# Patient Record
Sex: FEMALE | Race: WHITE | Employment: OTHER
[De-identification: names, ages, dates, MRNs, and addresses within clinical notes are randomized per-mention and may not be internally consistent; named-entity substitution may affect disease eponyms.]

---

## 2017-01-01 ENCOUNTER — HOME CARE VISIT (OUTPATIENT)
Dept: SCHEDULING | Facility: HOME HEALTH | Age: 72
End: 2017-01-01
Payer: MEDICARE

## 2017-01-01 ENCOUNTER — HOSPICE ADMISSION (OUTPATIENT)
Dept: HOSPICE | Facility: HOSPICE | Age: 72
End: 2017-01-01
Payer: MEDICARE

## 2017-01-01 ENCOUNTER — ANESTHESIA (OUTPATIENT)
Dept: SURGERY | Age: 72
DRG: 570 | End: 2017-01-01
Payer: MEDICARE

## 2017-01-01 ENCOUNTER — HOME CARE VISIT (OUTPATIENT)
Dept: HOSPICE | Facility: HOSPICE | Age: 72
End: 2017-01-01
Payer: MEDICARE

## 2017-01-01 ENCOUNTER — HOSPITAL ENCOUNTER (OUTPATIENT)
Dept: LAB | Age: 72
Discharge: HOME | End: 2017-03-31
Attending: FAMILY MEDICINE
Payer: MEDICARE

## 2017-01-01 ENCOUNTER — HOSPITAL ENCOUNTER (OUTPATIENT)
Dept: LAB | Age: 72
Discharge: HOME | End: 2017-02-14

## 2017-01-01 ENCOUNTER — HOME CARE VISIT (OUTPATIENT)
Dept: HOME HEALTH SERVICES | Facility: HOME HEALTH | Age: 72
End: 2017-01-01
Payer: MEDICARE

## 2017-01-01 ENCOUNTER — HOSPITAL ENCOUNTER (EMERGENCY)
Age: 72
Discharge: NURSING, REHAB OR LONG-TERM CARE | End: 2017-03-17
Attending: EMERGENCY MEDICINE
Payer: MEDICARE

## 2017-01-01 ENCOUNTER — HOME CARE VISIT (OUTPATIENT)
Dept: HOME HEALTH SERVICES | Facility: HOME HEALTH | Age: 72
End: 2017-01-01

## 2017-01-01 ENCOUNTER — APPOINTMENT (OUTPATIENT)
Dept: GENERAL RADIOLOGY | Age: 72
End: 2017-01-01
Attending: EMERGENCY MEDICINE
Payer: MEDICARE

## 2017-01-01 ENCOUNTER — HOME HEALTH ADMISSION (OUTPATIENT)
Dept: HOME HEALTH SERVICES | Facility: HOME HEALTH | Age: 72
End: 2017-01-01
Payer: MEDICARE

## 2017-01-01 ENCOUNTER — HOSPITAL ENCOUNTER (EMERGENCY)
Age: 72
Discharge: HOME | End: 2017-02-03
Attending: EMERGENCY MEDICINE
Payer: MEDICARE

## 2017-01-01 ENCOUNTER — HOSPITAL ENCOUNTER (INPATIENT)
Age: 72
LOS: 2 days | End: 2017-06-08
Attending: INTERNAL MEDICINE | Admitting: INTERNAL MEDICINE
Payer: MEDICARE

## 2017-01-01 ENCOUNTER — ANESTHESIA EVENT (OUTPATIENT)
Dept: SURGERY | Age: 72
DRG: 570 | End: 2017-01-01
Payer: MEDICARE

## 2017-01-01 ENCOUNTER — HOSPITAL ENCOUNTER (OUTPATIENT)
Dept: WOUND CARE | Age: 72
Discharge: HOME | End: 2017-03-30
Attending: SURGERY
Payer: MEDICARE

## 2017-01-01 ENCOUNTER — APPOINTMENT (OUTPATIENT)
Dept: GENERAL RADIOLOGY | Age: 72
DRG: 570 | End: 2017-01-01
Attending: INTERNAL MEDICINE
Payer: MEDICARE

## 2017-01-01 ENCOUNTER — HOSPITAL ENCOUNTER (INPATIENT)
Age: 72
LOS: 6 days | Discharge: NURSING, REHAB OR LONG-TERM CARE | DRG: 570 | End: 2017-02-23
Attending: EMERGENCY MEDICINE | Admitting: INTERNAL MEDICINE
Payer: MEDICARE

## 2017-01-01 ENCOUNTER — HOME HEALTH ADMISSION (OUTPATIENT)
Dept: HOME HEALTH SERVICES | Facility: HOME HEALTH | Age: 72
End: 2017-01-01

## 2017-01-01 ENCOUNTER — HOSPITAL ENCOUNTER (OUTPATIENT)
Dept: WOUND CARE | Age: 72
Discharge: HOME | End: 2017-04-14
Attending: NURSE PRACTITIONER
Payer: MEDICARE

## 2017-01-01 VITALS
TEMPERATURE: 98.3 F | HEART RATE: 62 BPM | BODY MASS INDEX: 39.05 KG/M2 | RESPIRATION RATE: 16 BRPM | OXYGEN SATURATION: 98 % | WEIGHT: 243 LBS | HEIGHT: 66 IN | DIASTOLIC BLOOD PRESSURE: 70 MMHG | SYSTOLIC BLOOD PRESSURE: 144 MMHG

## 2017-01-01 VITALS — SYSTOLIC BLOOD PRESSURE: 120 MMHG | RESPIRATION RATE: 17 BRPM | DIASTOLIC BLOOD PRESSURE: 83 MMHG | HEART RATE: 65 BPM

## 2017-01-01 VITALS
WEIGHT: 270 LBS | SYSTOLIC BLOOD PRESSURE: 170 MMHG | BODY MASS INDEX: 44.98 KG/M2 | HEIGHT: 65 IN | RESPIRATION RATE: 17 BRPM | DIASTOLIC BLOOD PRESSURE: 85 MMHG | OXYGEN SATURATION: 98 % | HEART RATE: 65 BPM | TEMPERATURE: 98.1 F

## 2017-01-01 VITALS
SYSTOLIC BLOOD PRESSURE: 142 MMHG | DIASTOLIC BLOOD PRESSURE: 72 MMHG | TEMPERATURE: 98.3 F | RESPIRATION RATE: 16 BRPM | HEART RATE: 76 BPM

## 2017-01-01 VITALS
SYSTOLIC BLOOD PRESSURE: 126 MMHG | TEMPERATURE: 98 F | RESPIRATION RATE: 18 BRPM | DIASTOLIC BLOOD PRESSURE: 64 MMHG | HEART RATE: 62 BPM

## 2017-01-01 VITALS
RESPIRATION RATE: 20 BRPM | OXYGEN SATURATION: 98 % | SYSTOLIC BLOOD PRESSURE: 132 MMHG | TEMPERATURE: 97.5 F | DIASTOLIC BLOOD PRESSURE: 86 MMHG | HEART RATE: 63 BPM

## 2017-01-01 VITALS
RESPIRATION RATE: 18 BRPM | OXYGEN SATURATION: 97 % | HEART RATE: 63 BPM | DIASTOLIC BLOOD PRESSURE: 68 MMHG | TEMPERATURE: 98.6 F | SYSTOLIC BLOOD PRESSURE: 112 MMHG

## 2017-01-01 VITALS
SYSTOLIC BLOOD PRESSURE: 130 MMHG | RESPIRATION RATE: 18 BRPM | OXYGEN SATURATION: 98 % | DIASTOLIC BLOOD PRESSURE: 82 MMHG | HEART RATE: 61 BPM | TEMPERATURE: 98.6 F

## 2017-01-01 VITALS
RESPIRATION RATE: 18 BRPM | OXYGEN SATURATION: 100 % | HEART RATE: 63 BPM | BODY MASS INDEX: 49.87 KG/M2 | SYSTOLIC BLOOD PRESSURE: 150 MMHG | WEIGHT: 271 LBS | DIASTOLIC BLOOD PRESSURE: 46 MMHG | HEIGHT: 62 IN | TEMPERATURE: 96.7 F

## 2017-01-01 VITALS — HEART RATE: 60 BPM | SYSTOLIC BLOOD PRESSURE: 113 MMHG | DIASTOLIC BLOOD PRESSURE: 47 MMHG | RESPIRATION RATE: 18 BRPM

## 2017-01-01 VITALS
SYSTOLIC BLOOD PRESSURE: 114 MMHG | TEMPERATURE: 97.7 F | DIASTOLIC BLOOD PRESSURE: 68 MMHG | OXYGEN SATURATION: 98 % | HEART RATE: 64 BPM | RESPIRATION RATE: 20 BRPM

## 2017-01-01 VITALS — HEART RATE: 66 BPM | SYSTOLIC BLOOD PRESSURE: 146 MMHG | RESPIRATION RATE: 18 BRPM | DIASTOLIC BLOOD PRESSURE: 62 MMHG

## 2017-01-01 VITALS
RESPIRATION RATE: 18 BRPM | OXYGEN SATURATION: 99 % | DIASTOLIC BLOOD PRESSURE: 60 MMHG | SYSTOLIC BLOOD PRESSURE: 102 MMHG | HEART RATE: 60 BPM

## 2017-01-01 VITALS
RESPIRATION RATE: 18 BRPM | HEART RATE: 82 BPM | DIASTOLIC BLOOD PRESSURE: 60 MMHG | SYSTOLIC BLOOD PRESSURE: 122 MMHG | TEMPERATURE: 97.1 F | OXYGEN SATURATION: 99 %

## 2017-01-01 VITALS
RESPIRATION RATE: 18 BRPM | DIASTOLIC BLOOD PRESSURE: 74 MMHG | HEART RATE: 62 BPM | SYSTOLIC BLOOD PRESSURE: 138 MMHG | TEMPERATURE: 98.2 F

## 2017-01-01 VITALS — RESPIRATION RATE: 20 BRPM | HEART RATE: 96 BPM

## 2017-01-01 VITALS
DIASTOLIC BLOOD PRESSURE: 58 MMHG | TEMPERATURE: 97.6 F | RESPIRATION RATE: 20 BRPM | HEART RATE: 68 BPM | OXYGEN SATURATION: 98 % | SYSTOLIC BLOOD PRESSURE: 158 MMHG

## 2017-01-01 VITALS
HEART RATE: 85 BPM | SYSTOLIC BLOOD PRESSURE: 134 MMHG | RESPIRATION RATE: 16 BRPM | OXYGEN SATURATION: 99 % | TEMPERATURE: 98.3 F | DIASTOLIC BLOOD PRESSURE: 72 MMHG

## 2017-01-01 VITALS
SYSTOLIC BLOOD PRESSURE: 136 MMHG | RESPIRATION RATE: 19 BRPM | TEMPERATURE: 96.6 F | HEART RATE: 78 BPM | DIASTOLIC BLOOD PRESSURE: 80 MMHG

## 2017-01-01 VITALS
SYSTOLIC BLOOD PRESSURE: 122 MMHG | HEART RATE: 60 BPM | OXYGEN SATURATION: 99 % | RESPIRATION RATE: 18 BRPM | DIASTOLIC BLOOD PRESSURE: 60 MMHG | TEMPERATURE: 97.1 F

## 2017-01-01 VITALS
TEMPERATURE: 97.2 F | DIASTOLIC BLOOD PRESSURE: 68 MMHG | HEART RATE: 78 BPM | SYSTOLIC BLOOD PRESSURE: 122 MMHG | RESPIRATION RATE: 20 BRPM

## 2017-01-01 VITALS — DIASTOLIC BLOOD PRESSURE: 72 MMHG | HEART RATE: 72 BPM | RESPIRATION RATE: 20 BRPM | SYSTOLIC BLOOD PRESSURE: 147 MMHG

## 2017-01-01 VITALS
RESPIRATION RATE: 18 BRPM | DIASTOLIC BLOOD PRESSURE: 78 MMHG | HEART RATE: 64 BPM | TEMPERATURE: 97.4 F | SYSTOLIC BLOOD PRESSURE: 118 MMHG

## 2017-01-01 VITALS
SYSTOLIC BLOOD PRESSURE: 130 MMHG | DIASTOLIC BLOOD PRESSURE: 60 MMHG | HEART RATE: 70 BPM | OXYGEN SATURATION: 98 % | TEMPERATURE: 97.5 F

## 2017-01-01 VITALS
SYSTOLIC BLOOD PRESSURE: 90 MMHG | RESPIRATION RATE: 22 BRPM | HEART RATE: 61 BPM | TEMPERATURE: 97.7 F | DIASTOLIC BLOOD PRESSURE: 54 MMHG | OXYGEN SATURATION: 99 %

## 2017-01-01 VITALS — TEMPERATURE: 97 F | SYSTOLIC BLOOD PRESSURE: 97 MMHG | DIASTOLIC BLOOD PRESSURE: 52 MMHG

## 2017-01-01 VITALS — SYSTOLIC BLOOD PRESSURE: 112 MMHG | DIASTOLIC BLOOD PRESSURE: 42 MMHG | RESPIRATION RATE: 18 BRPM | HEART RATE: 78 BPM

## 2017-01-01 VITALS
HEART RATE: 64 BPM | DIASTOLIC BLOOD PRESSURE: 46 MMHG | TEMPERATURE: 98.9 F | SYSTOLIC BLOOD PRESSURE: 118 MMHG | RESPIRATION RATE: 18 BRPM

## 2017-01-01 VITALS
RESPIRATION RATE: 18 BRPM | SYSTOLIC BLOOD PRESSURE: 132 MMHG | OXYGEN SATURATION: 98 % | TEMPERATURE: 97.5 F | HEART RATE: 63 BPM | DIASTOLIC BLOOD PRESSURE: 86 MMHG

## 2017-01-01 VITALS
HEART RATE: 63 BPM | DIASTOLIC BLOOD PRESSURE: 68 MMHG | OXYGEN SATURATION: 97 % | TEMPERATURE: 98.6 F | RESPIRATION RATE: 18 BRPM | SYSTOLIC BLOOD PRESSURE: 112 MMHG

## 2017-01-01 VITALS
SYSTOLIC BLOOD PRESSURE: 168 MMHG | RESPIRATION RATE: 20 BRPM | HEART RATE: 78 BPM | DIASTOLIC BLOOD PRESSURE: 88 MMHG | TEMPERATURE: 98.1 F

## 2017-01-01 VITALS
HEART RATE: 80 BPM | DIASTOLIC BLOOD PRESSURE: 80 MMHG | SYSTOLIC BLOOD PRESSURE: 136 MMHG | TEMPERATURE: 98.3 F | RESPIRATION RATE: 20 BRPM

## 2017-01-01 VITALS — SYSTOLIC BLOOD PRESSURE: 128 MMHG | HEART RATE: 72 BPM | RESPIRATION RATE: 20 BRPM | DIASTOLIC BLOOD PRESSURE: 70 MMHG

## 2017-01-01 VITALS
HEART RATE: 62 BPM | OXYGEN SATURATION: 97 % | DIASTOLIC BLOOD PRESSURE: 60 MMHG | TEMPERATURE: 97.4 F | RESPIRATION RATE: 18 BRPM | SYSTOLIC BLOOD PRESSURE: 132 MMHG

## 2017-01-01 VITALS — HEART RATE: 60 BPM | SYSTOLIC BLOOD PRESSURE: 110 MMHG | RESPIRATION RATE: 20 BRPM | DIASTOLIC BLOOD PRESSURE: 60 MMHG

## 2017-01-01 VITALS
HEART RATE: 61 BPM | DIASTOLIC BLOOD PRESSURE: 60 MMHG | OXYGEN SATURATION: 100 % | TEMPERATURE: 97.6 F | RESPIRATION RATE: 16 BRPM | SYSTOLIC BLOOD PRESSURE: 138 MMHG

## 2017-01-01 VITALS — HEART RATE: 60 BPM | RESPIRATION RATE: 12 BRPM

## 2017-01-01 VITALS
HEART RATE: 61 BPM | TEMPERATURE: 96.8 F | OXYGEN SATURATION: 98 % | RESPIRATION RATE: 18 BRPM | SYSTOLIC BLOOD PRESSURE: 130 MMHG | DIASTOLIC BLOOD PRESSURE: 82 MMHG

## 2017-01-01 VITALS — SYSTOLIC BLOOD PRESSURE: 142 MMHG | HEART RATE: 61 BPM | DIASTOLIC BLOOD PRESSURE: 67 MMHG | RESPIRATION RATE: 18 BRPM

## 2017-01-01 VITALS — RESPIRATION RATE: 17 BRPM | DIASTOLIC BLOOD PRESSURE: 68 MMHG | HEART RATE: 78 BPM | SYSTOLIC BLOOD PRESSURE: 122 MMHG

## 2017-01-01 VITALS
HEART RATE: 59 BPM | RESPIRATION RATE: 18 BRPM | DIASTOLIC BLOOD PRESSURE: 64 MMHG | OXYGEN SATURATION: 97 % | SYSTOLIC BLOOD PRESSURE: 138 MMHG | TEMPERATURE: 97.2 F

## 2017-01-01 VITALS
HEART RATE: 70 BPM | DIASTOLIC BLOOD PRESSURE: 80 MMHG | SYSTOLIC BLOOD PRESSURE: 134 MMHG | TEMPERATURE: 46.4 F | RESPIRATION RATE: 18 BRPM

## 2017-01-01 VITALS
TEMPERATURE: 97.2 F | OXYGEN SATURATION: 99 % | HEART RATE: 60 BPM | SYSTOLIC BLOOD PRESSURE: 145 MMHG | DIASTOLIC BLOOD PRESSURE: 60 MMHG

## 2017-01-01 VITALS
RESPIRATION RATE: 19 BRPM | TEMPERATURE: 97.5 F | DIASTOLIC BLOOD PRESSURE: 80 MMHG | SYSTOLIC BLOOD PRESSURE: 140 MMHG | HEART RATE: 67 BPM

## 2017-01-01 VITALS — SYSTOLIC BLOOD PRESSURE: 157 MMHG | HEART RATE: 69 BPM | RESPIRATION RATE: 18 BRPM | DIASTOLIC BLOOD PRESSURE: 73 MMHG

## 2017-01-01 DIAGNOSIS — R73.9 HYPERGLYCEMIA: ICD-10-CM

## 2017-01-01 DIAGNOSIS — L89.154: ICD-10-CM

## 2017-01-01 DIAGNOSIS — L08.9 INFECTED DECUBITUS ULCER, STAGE IV (HCC): Primary | ICD-10-CM

## 2017-01-01 DIAGNOSIS — L89.94 INFECTED DECUBITUS ULCER, STAGE IV (HCC): Primary | ICD-10-CM

## 2017-01-01 DIAGNOSIS — R07.89 MUSCULOSKELETAL CHEST PAIN: Primary | ICD-10-CM

## 2017-01-01 DIAGNOSIS — R07.81 PLEURITIC CHEST PAIN: ICD-10-CM

## 2017-01-01 DIAGNOSIS — L89.154 SACRAL DECUBITUS ULCER, STAGE IV (HCC): ICD-10-CM

## 2017-01-01 DIAGNOSIS — N30.00 ACUTE CYSTITIS WITHOUT HEMATURIA: Primary | ICD-10-CM

## 2017-01-01 LAB
ALBUMIN SERPL BCP-MCNC: 2.5 G/DL (ref 3.2–4.6)
ALBUMIN SERPL BCP-MCNC: 2.5 G/DL (ref 3.2–4.6)
ALBUMIN SERPL BCP-MCNC: 2.6 G/DL (ref 3.2–4.6)
ALBUMIN/GLOB SERPL: 0.5 {RATIO} (ref 1.2–3.5)
ALBUMIN/GLOB SERPL: 0.6 {RATIO} (ref 1.2–3.5)
ALBUMIN/GLOB SERPL: 0.6 {RATIO} (ref 1.2–3.5)
ALP SERPL-CCNC: 126 U/L (ref 50–136)
ALP SERPL-CCNC: 133 U/L (ref 50–136)
ALP SERPL-CCNC: 141 U/L (ref 50–136)
ALT SERPL-CCNC: 37 U/L (ref 12–65)
ALT SERPL-CCNC: 45 U/L (ref 12–65)
ALT SERPL-CCNC: 64 U/L (ref 12–65)
AMORPH CRY URNS QL MICRO: ABNORMAL
AMORPH CRY URNS QL MICRO: ABNORMAL
ANAEROBE ID RESULT 1, RAND1T: ABNORMAL
ANAEROBE ID W/SUSCEPT., ANIDLT: NORMAL
ANION GAP BLD CALC-SCNC: 4 MMOL/L (ref 7–16)
ANION GAP BLD CALC-SCNC: 6 MMOL/L (ref 7–16)
ANION GAP BLD CALC-SCNC: 6 MMOL/L (ref 7–16)
ANION GAP BLD CALC-SCNC: 7 MMOL/L (ref 7–16)
ANION GAP BLD CALC-SCNC: 8 MMOL/L (ref 7–16)
ANTIMICROBIAL SUSCEPT., RAND5T: ABNORMAL
APPEARANCE UR: ABNORMAL
AST SERPL W P-5'-P-CCNC: 37 U/L (ref 15–37)
AST SERPL W P-5'-P-CCNC: 39 U/L (ref 15–37)
AST SERPL W P-5'-P-CCNC: 50 U/L (ref 15–37)
ATRIAL RATE: 59 BPM
BACTERIA SPEC CULT: ABNORMAL
BACTERIA SPEC CULT: NORMAL
BACTERIA URNS QL MICRO: ABNORMAL /HPF
BASOPHILS # BLD AUTO: 0 K/UL (ref 0–0.2)
BASOPHILS # BLD: 0 % (ref 0–2)
BILIRUB SERPL-MCNC: 0.3 MG/DL (ref 0.2–1.1)
BILIRUB SERPL-MCNC: 0.4 MG/DL (ref 0.2–1.1)
BILIRUB SERPL-MCNC: 0.4 MG/DL (ref 0.2–1.1)
BILIRUB UR QL: NEGATIVE
BNP SERPL-MCNC: <2 PG/ML
BUN SERPL-MCNC: 19 MG/DL (ref 8–23)
BUN SERPL-MCNC: 20 MG/DL (ref 8–23)
BUN SERPL-MCNC: 21 MG/DL (ref 8–23)
BUN SERPL-MCNC: 27 MG/DL (ref 8–23)
BUN SERPL-MCNC: 38 MG/DL (ref 8–23)
BUN SERPL-MCNC: 52 MG/DL (ref 8–23)
BUN SERPL-MCNC: 52 MG/DL (ref 8–23)
CALCIUM SERPL-MCNC: 8.5 MG/DL (ref 8.3–10.4)
CALCIUM SERPL-MCNC: 8.6 MG/DL (ref 8.3–10.4)
CALCIUM SERPL-MCNC: 8.8 MG/DL (ref 8.3–10.4)
CALCULATED P AXIS, ECG09: NORMAL DEGREES
CALCULATED R AXIS, ECG10: 55 DEGREES
CALCULATED T AXIS, ECG11: 58 DEGREES
CASTS URNS QL MICRO: 0 /LPF
CASTS URNS QL MICRO: ABNORMAL /LPF
CHLORIDE SERPL-SCNC: 104 MMOL/L (ref 98–107)
CHLORIDE SERPL-SCNC: 106 MMOL/L (ref 98–107)
CHLORIDE SERPL-SCNC: 106 MMOL/L (ref 98–107)
CHLORIDE SERPL-SCNC: 109 MMOL/L (ref 98–107)
CHLORIDE SERPL-SCNC: 111 MMOL/L (ref 98–107)
CHLORIDE SERPL-SCNC: 98 MMOL/L (ref 98–107)
CHLORIDE SERPL-SCNC: 99 MMOL/L (ref 98–107)
CO2 SERPL-SCNC: 27 MMOL/L (ref 21–32)
CO2 SERPL-SCNC: 27 MMOL/L (ref 21–32)
CO2 SERPL-SCNC: 28 MMOL/L (ref 21–32)
CO2 SERPL-SCNC: 29 MMOL/L (ref 21–32)
CO2 SERPL-SCNC: 30 MMOL/L (ref 21–32)
CO2 SERPL-SCNC: 31 MMOL/L (ref 21–32)
CO2 SERPL-SCNC: 32 MMOL/L (ref 21–32)
COLOR UR: ABNORMAL
COLOR UR: ABNORMAL
COLOR UR: YELLOW
CREAT SERPL-MCNC: 0.83 MG/DL (ref 0.6–1)
CREAT SERPL-MCNC: 0.91 MG/DL (ref 0.6–1)
CREAT SERPL-MCNC: 1.04 MG/DL (ref 0.6–1)
CREAT SERPL-MCNC: 1.09 MG/DL (ref 0.6–1)
CREAT SERPL-MCNC: 1.37 MG/DL (ref 0.6–1)
CREAT SERPL-MCNC: 1.38 MG/DL (ref 0.6–1)
CREAT SERPL-MCNC: 1.46 MG/DL (ref 0.6–1)
CRP SERPL-MCNC: 4.7 MG/DL (ref 0–0.9)
CRYSTALS URNS QL MICRO: ABNORMAL /LPF
DIAGNOSIS, 93000: NORMAL
DIASTOLIC BP, ECG02: NORMAL MMHG
DIFFERENTIAL METHOD BLD: ABNORMAL
EOSINOPHIL # BLD: 0 K/UL (ref 0–0.8)
EOSINOPHIL # BLD: 0.1 K/UL (ref 0–0.8)
EOSINOPHIL NFR BLD: 0 % (ref 0.5–7.8)
EOSINOPHIL NFR BLD: 1 % (ref 0.5–7.8)
EPI CELLS #/AREA URNS HPF: 0 /HPF
EPI CELLS #/AREA URNS HPF: 0 /HPF
EPI CELLS #/AREA URNS HPF: ABNORMAL /HPF
ERYTHROCYTE [DISTWIDTH] IN BLOOD BY AUTOMATED COUNT: 13.9 % (ref 11.9–14.6)
ERYTHROCYTE [DISTWIDTH] IN BLOOD BY AUTOMATED COUNT: 14.9 % (ref 11.9–14.6)
ERYTHROCYTE [DISTWIDTH] IN BLOOD BY AUTOMATED COUNT: 16.7 % (ref 11.9–14.6)
ERYTHROCYTE [DISTWIDTH] IN BLOOD BY AUTOMATED COUNT: 16.8 % (ref 11.9–14.6)
ERYTHROCYTE [DISTWIDTH] IN BLOOD BY AUTOMATED COUNT: 16.8 % (ref 11.9–14.6)
ERYTHROCYTE [DISTWIDTH] IN BLOOD BY AUTOMATED COUNT: 16.9 % (ref 11.9–14.6)
ERYTHROCYTE [DISTWIDTH] IN BLOOD BY AUTOMATED COUNT: 17.1 % (ref 11.9–14.6)
ERYTHROCYTE [DISTWIDTH] IN BLOOD BY AUTOMATED COUNT: 17.5 % (ref 11.9–14.6)
EST. AVERAGE GLUCOSE BLD GHB EST-MCNC: 180 MG/DL
GLOBULIN SER CALC-MCNC: 4.3 G/DL (ref 2.3–3.5)
GLOBULIN SER CALC-MCNC: 4.5 G/DL (ref 2.3–3.5)
GLOBULIN SER CALC-MCNC: 4.7 G/DL (ref 2.3–3.5)
GLUCOSE BLD STRIP.AUTO-MCNC: 136 MG/DL (ref 65–100)
GLUCOSE BLD STRIP.AUTO-MCNC: 143 MG/DL (ref 65–100)
GLUCOSE BLD STRIP.AUTO-MCNC: 146 MG/DL (ref 65–100)
GLUCOSE BLD STRIP.AUTO-MCNC: 147 MG/DL (ref 65–100)
GLUCOSE BLD STRIP.AUTO-MCNC: 151 MG/DL (ref 65–100)
GLUCOSE BLD STRIP.AUTO-MCNC: 151 MG/DL (ref 65–100)
GLUCOSE BLD STRIP.AUTO-MCNC: 155 MG/DL (ref 65–100)
GLUCOSE BLD STRIP.AUTO-MCNC: 158 MG/DL (ref 65–100)
GLUCOSE BLD STRIP.AUTO-MCNC: 159 MG/DL (ref 65–100)
GLUCOSE BLD STRIP.AUTO-MCNC: 163 MG/DL (ref 65–100)
GLUCOSE BLD STRIP.AUTO-MCNC: 173 MG/DL (ref 65–100)
GLUCOSE BLD STRIP.AUTO-MCNC: 176 MG/DL (ref 65–100)
GLUCOSE BLD STRIP.AUTO-MCNC: 187 MG/DL (ref 65–100)
GLUCOSE BLD STRIP.AUTO-MCNC: 189 MG/DL (ref 65–100)
GLUCOSE BLD STRIP.AUTO-MCNC: 191 MG/DL (ref 65–100)
GLUCOSE BLD STRIP.AUTO-MCNC: 192 MG/DL (ref 65–100)
GLUCOSE BLD STRIP.AUTO-MCNC: 200 MG/DL (ref 65–100)
GLUCOSE BLD STRIP.AUTO-MCNC: 205 MG/DL (ref 65–100)
GLUCOSE BLD STRIP.AUTO-MCNC: 215 MG/DL (ref 65–100)
GLUCOSE BLD STRIP.AUTO-MCNC: 222 MG/DL (ref 65–100)
GLUCOSE BLD STRIP.AUTO-MCNC: 237 MG/DL (ref 65–100)
GLUCOSE BLD STRIP.AUTO-MCNC: 242 MG/DL (ref 65–100)
GLUCOSE BLD STRIP.AUTO-MCNC: 256 MG/DL (ref 65–100)
GLUCOSE BLD STRIP.AUTO-MCNC: 270 MG/DL (ref 65–100)
GLUCOSE SERPL-MCNC: 129 MG/DL (ref 65–100)
GLUCOSE SERPL-MCNC: 161 MG/DL (ref 65–100)
GLUCOSE SERPL-MCNC: 176 MG/DL (ref 65–100)
GLUCOSE SERPL-MCNC: 185 MG/DL (ref 65–100)
GLUCOSE SERPL-MCNC: 185 MG/DL (ref 65–100)
GLUCOSE SERPL-MCNC: 217 MG/DL (ref 65–100)
GLUCOSE SERPL-MCNC: 327 MG/DL (ref 65–100)
GLUCOSE UR STRIP.AUTO-MCNC: 100 MG/DL
GLUCOSE UR STRIP.AUTO-MCNC: 1000 MG/DL
GLUCOSE UR STRIP.AUTO-MCNC: NEGATIVE MG/DL
GRAM STN SPEC: ABNORMAL
HBA1C MFR BLD: 7.9 % (ref 4.8–6)
HCT VFR BLD AUTO: 26.1 % (ref 35.8–46.3)
HCT VFR BLD AUTO: 26.3 % (ref 35.8–46.3)
HCT VFR BLD AUTO: 27.1 % (ref 35.8–46.3)
HCT VFR BLD AUTO: 28.1 % (ref 35.8–46.3)
HCT VFR BLD AUTO: 29.5 % (ref 35.8–46.3)
HCT VFR BLD AUTO: 31.7 % (ref 35.8–46.3)
HCT VFR BLD AUTO: 34 % (ref 35.8–46.3)
HCT VFR BLD AUTO: 34.1 % (ref 35.8–46.3)
HGB BLD-MCNC: 10.1 G/DL (ref 11.7–15.4)
HGB BLD-MCNC: 10.3 G/DL (ref 11.7–15.4)
HGB BLD-MCNC: 10.6 G/DL (ref 11.7–15.4)
HGB BLD-MCNC: 7.8 G/DL (ref 11.7–15.4)
HGB BLD-MCNC: 8 G/DL (ref 11.7–15.4)
HGB BLD-MCNC: 8.2 G/DL (ref 11.7–15.4)
HGB BLD-MCNC: 8.7 G/DL (ref 11.7–15.4)
HGB BLD-MCNC: 9.2 G/DL (ref 11.7–15.4)
HGB UR QL STRIP: ABNORMAL
HGB UR QL STRIP: NEGATIVE
HGB UR QL STRIP: NEGATIVE
IMM GRANULOCYTES # BLD: 0 K/UL (ref 0–0.5)
IMM GRANULOCYTES # BLD: 0.1 K/UL (ref 0–0.5)
IMM GRANULOCYTES NFR BLD AUTO: 0.3 % (ref 0–5)
IMM GRANULOCYTES NFR BLD AUTO: 0.3 % (ref 0–5)
IMM GRANULOCYTES NFR BLD AUTO: 0.6 % (ref 0–5)
IMM GRANULOCYTES NFR BLD AUTO: 0.7 % (ref 0–5)
IMM GRANULOCYTES NFR BLD AUTO: 1.2 % (ref 0–5)
KETONES UR QL STRIP.AUTO: NEGATIVE MG/DL
LACTATE BLD-SCNC: 1.6 MMOL/L (ref 0.5–1.9)
LEUKOCYTE ESTERASE UR QL STRIP.AUTO: ABNORMAL
LYMPHOCYTES # BLD AUTO: 10 % (ref 13–44)
LYMPHOCYTES # BLD AUTO: 11 % (ref 13–44)
LYMPHOCYTES # BLD AUTO: 13 % (ref 13–44)
LYMPHOCYTES # BLD AUTO: 13 % (ref 13–44)
LYMPHOCYTES # BLD AUTO: 14 % (ref 13–44)
LYMPHOCYTES # BLD AUTO: 15 % (ref 13–44)
LYMPHOCYTES # BLD AUTO: 16 % (ref 13–44)
LYMPHOCYTES # BLD: 0.9 K/UL (ref 0.5–4.6)
LYMPHOCYTES # BLD: 1 K/UL (ref 0.5–4.6)
LYMPHOCYTES # BLD: 1.1 K/UL (ref 0.5–4.6)
LYMPHOCYTES # BLD: 1.2 K/UL (ref 0.5–4.6)
LYMPHOCYTES # BLD: 1.3 K/UL (ref 0.5–4.6)
LYMPHOCYTES # BLD: 1.4 K/UL (ref 0.5–4.6)
LYMPHOCYTES # BLD: 1.5 K/UL (ref 0.5–4.6)
MCH RBC QN AUTO: 29.7 PG (ref 26.1–32.9)
MCH RBC QN AUTO: 29.8 PG (ref 26.1–32.9)
MCH RBC QN AUTO: 29.9 PG (ref 26.1–32.9)
MCH RBC QN AUTO: 29.9 PG (ref 26.1–32.9)
MCH RBC QN AUTO: 30 PG (ref 26.1–32.9)
MCH RBC QN AUTO: 30 PG (ref 26.1–32.9)
MCH RBC QN AUTO: 30.4 PG (ref 26.1–32.9)
MCH RBC QN AUTO: 30.8 PG (ref 26.1–32.9)
MCHC RBC AUTO-ENTMCNC: 29.9 G/DL (ref 31.4–35)
MCHC RBC AUTO-ENTMCNC: 30.2 G/DL (ref 31.4–35)
MCHC RBC AUTO-ENTMCNC: 30.3 G/DL (ref 31.4–35)
MCHC RBC AUTO-ENTMCNC: 30.4 G/DL (ref 31.4–35)
MCHC RBC AUTO-ENTMCNC: 31 G/DL (ref 31.4–35)
MCHC RBC AUTO-ENTMCNC: 31.2 G/DL (ref 31.4–35)
MCHC RBC AUTO-ENTMCNC: 31.2 G/DL (ref 31.4–35)
MCHC RBC AUTO-ENTMCNC: 31.9 G/DL (ref 31.4–35)
MCV RBC AUTO: 100.6 FL (ref 79.6–97.8)
MCV RBC AUTO: 95.8 FL (ref 79.6–97.8)
MCV RBC AUTO: 96.1 FL (ref 79.6–97.8)
MCV RBC AUTO: 96.6 FL (ref 79.6–97.8)
MCV RBC AUTO: 96.9 FL (ref 79.6–97.8)
MCV RBC AUTO: 98.1 FL (ref 79.6–97.8)
MCV RBC AUTO: 98.2 FL (ref 79.6–97.8)
MCV RBC AUTO: 99.6 FL (ref 79.6–97.8)
MM INDURATION POC: NORMAL MM (ref 0–5)
MM INDURATION POC: NORMAL MM (ref 0–5)
MONOCYTES # BLD: 0.5 K/UL (ref 0.1–1.3)
MONOCYTES # BLD: 0.5 K/UL (ref 0.1–1.3)
MONOCYTES # BLD: 0.6 K/UL (ref 0.1–1.3)
MONOCYTES # BLD: 0.7 K/UL (ref 0.1–1.3)
MONOCYTES # BLD: 0.7 K/UL (ref 0.1–1.3)
MONOCYTES # BLD: 0.8 K/UL (ref 0.1–1.3)
MONOCYTES # BLD: 1 K/UL (ref 0.1–1.3)
MONOCYTES NFR BLD AUTO: 6 % (ref 4–12)
MONOCYTES NFR BLD AUTO: 6 % (ref 4–12)
MONOCYTES NFR BLD AUTO: 7 % (ref 4–12)
MONOCYTES NFR BLD AUTO: 7 % (ref 4–12)
MONOCYTES NFR BLD AUTO: 9 % (ref 4–12)
MUCOUS THREADS URNS QL MICRO: 0 /LPF
NEUTS SEG # BLD: 5.2 K/UL (ref 1.7–8.2)
NEUTS SEG # BLD: 6.1 K/UL (ref 1.7–8.2)
NEUTS SEG # BLD: 6.8 K/UL (ref 1.7–8.2)
NEUTS SEG # BLD: 7.2 K/UL (ref 1.7–8.2)
NEUTS SEG # BLD: 7.4 K/UL (ref 1.7–8.2)
NEUTS SEG # BLD: 8.2 K/UL (ref 1.7–8.2)
NEUTS SEG # BLD: 9.2 K/UL (ref 1.7–8.2)
NEUTS SEG NFR BLD AUTO: 73 % (ref 43–78)
NEUTS SEG NFR BLD AUTO: 75 % (ref 43–78)
NEUTS SEG NFR BLD AUTO: 76 % (ref 43–78)
NEUTS SEG NFR BLD AUTO: 77 % (ref 43–78)
NEUTS SEG NFR BLD AUTO: 79 % (ref 43–78)
NEUTS SEG NFR BLD AUTO: 81 % (ref 43–78)
NEUTS SEG NFR BLD AUTO: 83 % (ref 43–78)
NITRITE UR QL STRIP.AUTO: NEGATIVE
P-R INTERVAL, ECG05: NORMAL MS
PH UR STRIP: 6.5 [PH] (ref 5–9)
PH UR STRIP: 7 [PH] (ref 5–9)
PH UR STRIP: 8.5 [PH] (ref 5–9)
PLATELET # BLD AUTO: 250 K/UL (ref 150–450)
PLATELET # BLD AUTO: 276 K/UL (ref 150–450)
PLATELET # BLD AUTO: 287 K/UL (ref 150–450)
PLATELET # BLD AUTO: 292 K/UL (ref 150–450)
PLATELET # BLD AUTO: 313 K/UL (ref 150–450)
PLATELET # BLD AUTO: 314 K/UL (ref 150–450)
PLATELET # BLD AUTO: 329 K/UL (ref 150–450)
PLATELET # BLD AUTO: 350 K/UL (ref 150–450)
PMV BLD AUTO: 10.2 FL (ref 10.8–14.1)
PMV BLD AUTO: 10.3 FL (ref 10.8–14.1)
PMV BLD AUTO: 10.4 FL (ref 10.8–14.1)
PMV BLD AUTO: 10.4 FL (ref 10.8–14.1)
PMV BLD AUTO: 10.5 FL (ref 10.8–14.1)
PMV BLD AUTO: 10.7 FL (ref 10.8–14.1)
PMV BLD AUTO: 10.7 FL (ref 10.8–14.1)
PMV BLD AUTO: 9.8 FL (ref 10.8–14.1)
POTASSIUM SERPL-SCNC: 4 MMOL/L (ref 3.5–5.1)
POTASSIUM SERPL-SCNC: 4.4 MMOL/L (ref 3.5–5.1)
POTASSIUM SERPL-SCNC: 4.5 MMOL/L (ref 3.5–5.1)
POTASSIUM SERPL-SCNC: 4.7 MMOL/L (ref 3.5–5.1)
PPD POC: NEGATIVE NEGATIVE
PPD POC: NORMAL NEGATIVE
PROT SERPL-MCNC: 6.9 G/DL (ref 6.3–8.2)
PROT SERPL-MCNC: 7 G/DL (ref 6.3–8.2)
PROT SERPL-MCNC: 7.2 G/DL (ref 6.3–8.2)
PROT UR STRIP-MCNC: 30 MG/DL
PROT UR STRIP-MCNC: NEGATIVE MG/DL
PROT UR STRIP-MCNC: NEGATIVE MG/DL
Q-T INTERVAL, ECG07: 490 MS
QRS DURATION, ECG06: 92 MS
QTC CALCULATION (BEZET), ECG08: 490 MS
RBC # BLD AUTO: 2.62 M/UL (ref 4.05–5.25)
RBC # BLD AUTO: 2.68 M/UL (ref 4.05–5.25)
RBC # BLD AUTO: 2.76 M/UL (ref 4.05–5.25)
RBC # BLD AUTO: 2.9 M/UL (ref 4.05–5.25)
RBC # BLD AUTO: 3.07 M/UL (ref 4.05–5.25)
RBC # BLD AUTO: 3.28 M/UL (ref 4.05–5.25)
RBC # BLD AUTO: 3.39 M/UL (ref 4.05–5.25)
RBC # BLD AUTO: 3.55 M/UL (ref 4.05–5.25)
RBC #/AREA URNS HPF: ABNORMAL /HPF
SERVICE CMNT-IMP: ABNORMAL
SERVICE CMNT-IMP: NORMAL
SODIUM SERPL-SCNC: 137 MMOL/L (ref 136–145)
SODIUM SERPL-SCNC: 137 MMOL/L (ref 136–145)
SODIUM SERPL-SCNC: 139 MMOL/L (ref 136–145)
SODIUM SERPL-SCNC: 139 MMOL/L (ref 136–145)
SODIUM SERPL-SCNC: 143 MMOL/L (ref 136–145)
SODIUM SERPL-SCNC: 144 MMOL/L (ref 136–145)
SODIUM SERPL-SCNC: 145 MMOL/L (ref 136–145)
SP GR UR REFRACTOMETRY: 1.01 (ref 1–1.02)
SP GR UR REFRACTOMETRY: 1.01 (ref 1–1.02)
SP GR UR REFRACTOMETRY: 1.02 (ref 1–1.02)
SPECIMEN SOURCE: NORMAL
SYSTOLIC BP, ECG01: NORMAL MMHG
TROPONIN I BLD-MCNC: 0 NG/ML (ref 0–0.08)
UROBILINOGEN UR QL STRIP.AUTO: 0.2 EU/DL (ref 0.2–1)
VANCOMYCIN TROUGH SERPL-MCNC: 27.3 UG/ML (ref 5–20)
VENTRICULAR RATE, ECG03: 60 BPM
WBC # BLD AUTO: 10 K/UL (ref 4.3–11.1)
WBC # BLD AUTO: 11.9 K/UL (ref 4.3–11.1)
WBC # BLD AUTO: 17.9 K/UL (ref 4.3–11.1)
WBC # BLD AUTO: 7.2 K/UL (ref 4.3–11.1)
WBC # BLD AUTO: 7.8 K/UL (ref 4.3–11.1)
WBC # BLD AUTO: 8.9 K/UL (ref 4.3–11.1)
WBC # BLD AUTO: 9 K/UL (ref 4.3–11.1)
WBC # BLD AUTO: 9.4 K/UL (ref 4.3–11.1)
WBC URNS QL MICRO: ABNORMAL /HPF

## 2017-01-01 PROCEDURE — 85025 COMPLETE CBC W/AUTO DIFF WBC: CPT | Performed by: INTERNAL MEDICINE

## 2017-01-01 PROCEDURE — 74011250637 HC RX REV CODE- 250/637: Performed by: INTERNAL MEDICINE

## 2017-01-01 PROCEDURE — A4213 20+ CC SYRINGE ONLY: HCPCS

## 2017-01-01 PROCEDURE — 74011000258 HC RX REV CODE- 258: Performed by: INTERNAL MEDICINE

## 2017-01-01 PROCEDURE — 0651 HSPC ROUTINE HOME CARE

## 2017-01-01 PROCEDURE — 74011636637 HC RX REV CODE- 636/637: Performed by: INTERNAL MEDICINE

## 2017-01-01 PROCEDURE — G0156 HHCP-SVS OF AIDE,EA 15 MIN: HCPCS

## 2017-01-01 PROCEDURE — A4333 URINARY CATH ANCHOR DEVICE: HCPCS

## 2017-01-01 PROCEDURE — 65270000029 HC RM PRIVATE

## 2017-01-01 PROCEDURE — G0158 HHC OT ASSISTANT EA 15: HCPCS

## 2017-01-01 PROCEDURE — A6212 FOAM DRG <=16 SQ IN W/BORDER: HCPCS

## 2017-01-01 PROCEDURE — 94640 AIRWAY INHALATION TREATMENT: CPT

## 2017-01-01 PROCEDURE — 74011250636 HC RX REV CODE- 250/636: Performed by: INTERNAL MEDICINE

## 2017-01-01 PROCEDURE — G0299 HHS/HOSPICE OF RN EA 15 MIN: HCPCS

## 2017-01-01 PROCEDURE — 77030032490 HC SLV COMPR SCD KNE COVD -B: Performed by: COLON & RECTAL SURGERY

## 2017-01-01 PROCEDURE — 74011000302 HC RX REV CODE- 302: Performed by: INTERNAL MEDICINE

## 2017-01-01 PROCEDURE — 36415 COLL VENOUS BLD VENIPUNCTURE: CPT | Performed by: INTERNAL MEDICINE

## 2017-01-01 PROCEDURE — 87176 TISSUE HOMOGENIZATION CULTR: CPT | Performed by: INTERNAL MEDICINE

## 2017-01-01 PROCEDURE — 94760 N-INVAS EAR/PLS OXIMETRY 1: CPT

## 2017-01-01 PROCEDURE — 85025 COMPLETE CBC W/AUTO DIFF WBC: CPT | Performed by: EMERGENCY MEDICINE

## 2017-01-01 PROCEDURE — 80048 BASIC METABOLIC PNL TOTAL CA: CPT | Performed by: INTERNAL MEDICINE

## 2017-01-01 PROCEDURE — 74011000258 HC RX REV CODE- 258: Performed by: EMERGENCY MEDICINE

## 2017-01-01 PROCEDURE — 82962 GLUCOSE BLOOD TEST: CPT

## 2017-01-01 PROCEDURE — 74011250636 HC RX REV CODE- 250/636: Performed by: EMERGENCY MEDICINE

## 2017-01-01 PROCEDURE — A6234 HYDROCOLLD DRG <=16 W/O BDR: HCPCS

## 2017-01-01 PROCEDURE — 74011250636 HC RX REV CODE- 250/636: Performed by: NURSE PRACTITIONER

## 2017-01-01 PROCEDURE — 0656 HSPC GENERAL INPATIENT

## 2017-01-01 PROCEDURE — 87186 SC STD MICRODIL/AGAR DIL: CPT | Performed by: EMERGENCY MEDICINE

## 2017-01-01 PROCEDURE — 77010033678 HC OXYGEN DAILY

## 2017-01-01 PROCEDURE — 71010 XR CHEST PORT: CPT

## 2017-01-01 PROCEDURE — 87075 CULTR BACTERIA EXCEPT BLOOD: CPT | Performed by: COLON & RECTAL SURGERY

## 2017-01-01 PROCEDURE — 77030011256 HC DRSG MEPILEX <16IN NO BORD MOLN -A

## 2017-01-01 PROCEDURE — HOSPICE MEDICATION HC HH HOSPICE MEDICATION

## 2017-01-01 PROCEDURE — G0157 HHC PT ASSISTANT EA 15: HCPCS

## 2017-01-01 PROCEDURE — A5120 SKIN BARRIER, WIPE OR SWAB: HCPCS

## 2017-01-01 PROCEDURE — 77030018836 HC SOL IRR NACL ICUM -A

## 2017-01-01 PROCEDURE — A6222 GAUZE <=16 IN NO W/SAL W/O B: HCPCS

## 2017-01-01 PROCEDURE — 74011000250 HC RX REV CODE- 250: Performed by: INTERNAL MEDICINE

## 2017-01-01 PROCEDURE — 74011636637 HC RX REV CODE- 636/637: Performed by: NURSE PRACTITIONER

## 2017-01-01 PROCEDURE — A6252 ABSORPT DRG >16 <=48 W/O BDR: HCPCS

## 2017-01-01 PROCEDURE — 96374 THER/PROPH/DIAG INJ IV PUSH: CPT | Performed by: EMERGENCY MEDICINE

## 2017-01-01 PROCEDURE — 99213 OFFICE O/P EST LOW 20 MIN: CPT

## 2017-01-01 PROCEDURE — G0155 HHCP-SVS OF CSW,EA 15 MIN: HCPCS

## 2017-01-01 PROCEDURE — 77030019605

## 2017-01-01 PROCEDURE — 400013 HH SOC

## 2017-01-01 PROCEDURE — 74011250636 HC RX REV CODE- 250/636

## 2017-01-01 PROCEDURE — 77030020399: Performed by: COLON & RECTAL SURGERY

## 2017-01-01 PROCEDURE — 80053 COMPREHEN METABOLIC PANEL: CPT | Performed by: EMERGENCY MEDICINE

## 2017-01-01 PROCEDURE — 74011250637 HC RX REV CODE- 250/637: Performed by: NURSE PRACTITIONER

## 2017-01-01 PROCEDURE — A6250 SKIN SEAL PROTECT MOISTURIZR: HCPCS

## 2017-01-01 PROCEDURE — 83605 ASSAY OF LACTIC ACID: CPT

## 2017-01-01 PROCEDURE — 36415 COLL VENOUS BLD VENIPUNCTURE: CPT | Performed by: NURSE PRACTITIONER

## 2017-01-01 PROCEDURE — 85027 COMPLETE CBC AUTOMATED: CPT | Performed by: FAMILY MEDICINE

## 2017-01-01 PROCEDURE — 76210000016 HC OR PH I REC 1 TO 1.5 HR: Performed by: COLON & RECTAL SURGERY

## 2017-01-01 PROCEDURE — 87205 SMEAR GRAM STAIN: CPT | Performed by: EMERGENCY MEDICINE

## 2017-01-01 PROCEDURE — A4310 INSERT TRAY W/O BAG/CATH: HCPCS

## 2017-01-01 PROCEDURE — A4357 BEDSIDE DRAINAGE BAG: HCPCS

## 2017-01-01 PROCEDURE — T4541 LARGE DISPOSABLE UNDERPAD: HCPCS

## 2017-01-01 PROCEDURE — 96375 TX/PRO/DX INJ NEW DRUG ADDON: CPT | Performed by: EMERGENCY MEDICINE

## 2017-01-01 PROCEDURE — 0JB70ZZ EXCISION OF BACK SUBCUTANEOUS TISSUE AND FASCIA, OPEN APPROACH: ICD-10-PCS | Performed by: COLON & RECTAL SURGERY

## 2017-01-01 PROCEDURE — 87205 SMEAR GRAM STAIN: CPT | Performed by: COLON & RECTAL SURGERY

## 2017-01-01 PROCEDURE — 81001 URINALYSIS AUTO W/SCOPE: CPT | Performed by: INTERNAL MEDICINE

## 2017-01-01 PROCEDURE — 86580 TB INTRADERMAL TEST: CPT | Performed by: INTERNAL MEDICINE

## 2017-01-01 PROCEDURE — 99284 EMERGENCY DEPT VISIT MOD MDM: CPT | Performed by: EMERGENCY MEDICINE

## 2017-01-01 PROCEDURE — A6199 ALGINATE DRSG WOUND FILLER: HCPCS

## 2017-01-01 PROCEDURE — A4247 BETADINE/IODINE SWABS/WIPES: HCPCS

## 2017-01-01 PROCEDURE — A4927 NON-STERILE GLOVES: HCPCS

## 2017-01-01 PROCEDURE — 77030005515 HC CATH URETH FOL14 BARD -B

## 2017-01-01 PROCEDURE — A4649 SURGICAL SUPPLIES: HCPCS

## 2017-01-01 PROCEDURE — G0152 HHCP-SERV OF OT,EA 15 MIN: HCPCS

## 2017-01-01 PROCEDURE — 77030033269 HC SLV COMPR SCD KNE2 CARD -B

## 2017-01-01 PROCEDURE — 3336500001 HSPC ELECTION

## 2017-01-01 PROCEDURE — 84484 ASSAY OF TROPONIN QUANT: CPT

## 2017-01-01 PROCEDURE — A6196 ALGINATE DRESSING <=16 SQ IN: HCPCS

## 2017-01-01 PROCEDURE — A6223 GAUZE >16<=48 NO W/SAL W/O B: HCPCS

## 2017-01-01 PROCEDURE — 80202 ASSAY OF VANCOMYCIN: CPT | Performed by: INTERNAL MEDICINE

## 2017-01-01 PROCEDURE — G0151 HHCP-SERV OF PT,EA 15 MIN: HCPCS

## 2017-01-01 PROCEDURE — A6266 IMPREG GAUZE NO H20/SAL/YARD: HCPCS

## 2017-01-01 PROCEDURE — A4452 WATERPROOF TAPE: HCPCS

## 2017-01-01 PROCEDURE — A4450 NON-WATERPROOF TAPE: HCPCS

## 2017-01-01 PROCEDURE — 87186 SC STD MICRODIL/AGAR DIL: CPT | Performed by: COLON & RECTAL SURGERY

## 2017-01-01 PROCEDURE — 74011000250 HC RX REV CODE- 250: Performed by: EMERGENCY MEDICINE

## 2017-01-01 PROCEDURE — 86140 C-REACTIVE PROTEIN: CPT | Performed by: EMERGENCY MEDICINE

## 2017-01-01 PROCEDURE — 99343 PR HOME VST NEW PATIENT MOD-HI SEVERITY 45 MINUTES: CPT | Performed by: INTERNAL MEDICINE

## 2017-01-01 PROCEDURE — 87040 BLOOD CULTURE FOR BACTERIA: CPT | Performed by: EMERGENCY MEDICINE

## 2017-01-01 PROCEDURE — 87077 CULTURE AEROBIC IDENTIFY: CPT | Performed by: EMERGENCY MEDICINE

## 2017-01-01 PROCEDURE — 85025 COMPLETE CBC W/AUTO DIFF WBC: CPT | Performed by: NURSE PRACTITIONER

## 2017-01-01 PROCEDURE — 96365 THER/PROPH/DIAG IV INF INIT: CPT | Performed by: EMERGENCY MEDICINE

## 2017-01-01 PROCEDURE — 83880 ASSAY OF NATRIURETIC PEPTIDE: CPT | Performed by: EMERGENCY MEDICINE

## 2017-01-01 PROCEDURE — 74011250636 HC RX REV CODE- 250/636: Performed by: ANESTHESIOLOGY

## 2017-01-01 PROCEDURE — 36415 COLL VENOUS BLD VENIPUNCTURE: CPT | Performed by: FAMILY MEDICINE

## 2017-01-01 PROCEDURE — A4338 INDWELLING CATHETER LATEX: HCPCS

## 2017-01-01 PROCEDURE — 94664 DEMO&/EVAL PT USE INHALER: CPT

## 2017-01-01 PROCEDURE — 93005 ELECTROCARDIOGRAM TRACING: CPT | Performed by: EMERGENCY MEDICINE

## 2017-01-01 PROCEDURE — A6260 WOUND CLEANSER ANY TYPE/SIZE: HCPCS

## 2017-01-01 PROCEDURE — 81003 URINALYSIS AUTO W/O SCOPE: CPT | Performed by: EMERGENCY MEDICINE

## 2017-01-01 PROCEDURE — 81003 URINALYSIS AUTO W/O SCOPE: CPT | Performed by: INTERNAL MEDICINE

## 2017-01-01 PROCEDURE — 74011000250 HC RX REV CODE- 250

## 2017-01-01 PROCEDURE — 74011000258 HC RX REV CODE- 258

## 2017-01-01 PROCEDURE — 76060000033 HC ANESTHESIA 1 TO 1.5 HR: Performed by: COLON & RECTAL SURGERY

## 2017-01-01 PROCEDURE — 83036 HEMOGLOBIN GLYCOSYLATED A1C: CPT | Performed by: INTERNAL MEDICINE

## 2017-01-01 PROCEDURE — 99285 EMERGENCY DEPT VISIT HI MDM: CPT | Performed by: EMERGENCY MEDICINE

## 2017-01-01 PROCEDURE — 76010000149 HC OR TIME 1 TO 1.5 HR: Performed by: COLON & RECTAL SURGERY

## 2017-01-01 PROCEDURE — 80048 BASIC METABOLIC PNL TOTAL CA: CPT | Performed by: NURSE PRACTITIONER

## 2017-01-01 PROCEDURE — 87641 MR-STAPH DNA AMP PROBE: CPT | Performed by: INTERNAL MEDICINE

## 2017-01-01 PROCEDURE — 87076 CULTURE ANAEROBE IDENT EACH: CPT | Performed by: INTERNAL MEDICINE

## 2017-01-01 PROCEDURE — 51702 INSERT TEMP BLADDER CATH: CPT | Performed by: EMERGENCY MEDICINE

## 2017-01-01 PROCEDURE — 74011000250 HC RX REV CODE- 250: Performed by: COLON & RECTAL SURGERY

## 2017-01-01 PROCEDURE — 87077 CULTURE AEROBIC IDENTIFY: CPT | Performed by: COLON & RECTAL SURGERY

## 2017-01-01 PROCEDURE — 51798 US URINE CAPACITY MEASURE: CPT

## 2017-01-01 PROCEDURE — 99214 OFFICE O/P EST MOD 30 MIN: CPT

## 2017-01-01 RX ORDER — ROCURONIUM BROMIDE 10 MG/ML
INJECTION, SOLUTION INTRAVENOUS AS NEEDED
Status: DISCONTINUED | OUTPATIENT
Start: 2017-01-01 | End: 2017-01-01 | Stop reason: HOSPADM

## 2017-01-01 RX ORDER — AMIODARONE HYDROCHLORIDE 200 MG/1
200 TABLET ORAL 2 TIMES DAILY
Status: DISCONTINUED | OUTPATIENT
Start: 2017-01-01 | End: 2017-01-01 | Stop reason: HOSPADM

## 2017-01-01 RX ORDER — HYDROCODONE BITARTRATE AND ACETAMINOPHEN 5; 325 MG/1; MG/1
1 TABLET ORAL
Qty: 12 TAB | Refills: 0 | Status: SHIPPED | OUTPATIENT
Start: 2017-01-01 | End: 2017-01-01

## 2017-01-01 RX ORDER — FENTANYL CITRATE 50 UG/ML
INJECTION, SOLUTION INTRAMUSCULAR; INTRAVENOUS AS NEEDED
Status: DISCONTINUED | OUTPATIENT
Start: 2017-01-01 | End: 2017-01-01 | Stop reason: HOSPADM

## 2017-01-01 RX ORDER — SAME BUTANEDISULFONATE/BETAINE 400-600 MG
500 POWDER IN PACKET (EA) ORAL 2 TIMES DAILY
Status: DISCONTINUED | OUTPATIENT
Start: 2017-01-01 | End: 2017-01-01 | Stop reason: HOSPADM

## 2017-01-01 RX ORDER — LOSARTAN POTASSIUM 25 MG/1
25 TABLET ORAL DAILY
Status: DISCONTINUED | OUTPATIENT
Start: 2017-01-01 | End: 2017-01-01 | Stop reason: HOSPADM

## 2017-01-01 RX ORDER — HYDROCODONE BITARTRATE AND ACETAMINOPHEN 5; 325 MG/1; MG/1
1 TABLET ORAL AS NEEDED
Status: DISCONTINUED | OUTPATIENT
Start: 2017-01-01 | End: 2017-01-01 | Stop reason: HOSPADM

## 2017-01-01 RX ORDER — DOCUSATE SODIUM 100 MG/1
100 CAPSULE, LIQUID FILLED ORAL 2 TIMES DAILY
Status: DISCONTINUED | OUTPATIENT
Start: 2017-01-01 | End: 2017-01-01 | Stop reason: HOSPADM

## 2017-01-01 RX ORDER — FACIAL-BODY WIPES
10 EACH TOPICAL AS NEEDED
Status: DISCONTINUED | OUTPATIENT
Start: 2017-01-01 | End: 2017-01-01 | Stop reason: HOSPADM

## 2017-01-01 RX ORDER — SODIUM CHLORIDE, SODIUM LACTATE, POTASSIUM CHLORIDE, CALCIUM CHLORIDE 600; 310; 30; 20 MG/100ML; MG/100ML; MG/100ML; MG/100ML
INJECTION, SOLUTION INTRAVENOUS
Status: DISCONTINUED | OUTPATIENT
Start: 2017-01-01 | End: 2017-01-01 | Stop reason: HOSPADM

## 2017-01-01 RX ORDER — ASPIRIN 81 MG/1
81 TABLET ORAL DAILY
Status: DISCONTINUED | OUTPATIENT
Start: 2017-01-01 | End: 2017-01-01 | Stop reason: HOSPADM

## 2017-01-01 RX ORDER — LIDOCAINE HYDROCHLORIDE 20 MG/ML
INJECTION, SOLUTION EPIDURAL; INFILTRATION; INTRACAUDAL; PERINEURAL AS NEEDED
Status: DISCONTINUED | OUTPATIENT
Start: 2017-01-01 | End: 2017-01-01 | Stop reason: HOSPADM

## 2017-01-01 RX ORDER — HYDROCODONE BITARTRATE AND ACETAMINOPHEN 10; 325 MG/1; MG/1
1 TABLET ORAL
Qty: 15 TAB | Refills: 0 | Status: SHIPPED | OUTPATIENT
Start: 2017-01-01 | End: 2017-01-01

## 2017-01-01 RX ORDER — INSULIN GLARGINE 100 [IU]/ML
12 INJECTION, SOLUTION SUBCUTANEOUS
Status: DISCONTINUED | OUTPATIENT
Start: 2017-01-01 | End: 2017-01-01

## 2017-01-01 RX ORDER — HYDROMORPHONE HYDROCHLORIDE 1 MG/ML
1 INJECTION, SOLUTION INTRAMUSCULAR; INTRAVENOUS; SUBCUTANEOUS
Status: DISCONTINUED | OUTPATIENT
Start: 2017-01-01 | End: 2017-01-01 | Stop reason: HOSPADM

## 2017-01-01 RX ORDER — VANCOMYCIN 1.75 GRAM/500 ML IN 0.9 % SODIUM CHLORIDE INTRAVENOUS
1750 EVERY 12 HOURS
Status: DISCONTINUED | OUTPATIENT
Start: 2017-01-01 | End: 2017-01-01

## 2017-01-01 RX ORDER — SODIUM CHLORIDE 0.9 % (FLUSH) 0.9 %
5-10 SYRINGE (ML) INJECTION EVERY 8 HOURS
Status: DISCONTINUED | OUTPATIENT
Start: 2017-01-01 | End: 2017-01-01 | Stop reason: SDUPTHER

## 2017-01-01 RX ORDER — CEFPODOXIME PROXETIL 200 MG/1
200 TABLET, FILM COATED ORAL EVERY 12 HOURS
Qty: 14 TAB | Refills: 0 | Status: SHIPPED | OUTPATIENT
Start: 2017-01-01 | End: 2017-01-01

## 2017-01-01 RX ORDER — ALBUTEROL SULFATE 2.5 MG/.5ML
2.5 SOLUTION RESPIRATORY (INHALATION)
Status: DISCONTINUED | OUTPATIENT
Start: 2017-01-01 | End: 2017-01-01 | Stop reason: HOSPADM

## 2017-01-01 RX ORDER — CEPHALEXIN 500 MG/1
500 CAPSULE ORAL 4 TIMES DAILY
Qty: 28 CAP | Refills: 0 | Status: SHIPPED | OUTPATIENT
Start: 2017-01-01 | End: 2017-01-01

## 2017-01-01 RX ORDER — INSULIN LISPRO 100 [IU]/ML
INJECTION, SOLUTION INTRAVENOUS; SUBCUTANEOUS EVERY 6 HOURS
Status: DISCONTINUED | OUTPATIENT
Start: 2017-01-01 | End: 2017-01-01 | Stop reason: HOSPADM

## 2017-01-01 RX ORDER — MORPHINE SULFATE 2 MG/ML
4 INJECTION, SOLUTION INTRAMUSCULAR; INTRAVENOUS
Status: COMPLETED | OUTPATIENT
Start: 2017-01-01 | End: 2017-01-01

## 2017-01-01 RX ORDER — ACETAMINOPHEN 325 MG/1
650 TABLET ORAL
Status: DISCONTINUED | OUTPATIENT
Start: 2017-01-01 | End: 2017-01-01 | Stop reason: HOSPADM

## 2017-01-01 RX ORDER — HALOPERIDOL 5 MG/ML
2 INJECTION INTRAMUSCULAR
Status: DISCONTINUED | OUTPATIENT
Start: 2017-01-01 | End: 2017-01-01 | Stop reason: HOSPADM

## 2017-01-01 RX ORDER — FENTANYL 25 UG/1
1 PATCH TRANSDERMAL
Status: DISCONTINUED | OUTPATIENT
Start: 2017-01-01 | End: 2017-01-01 | Stop reason: HOSPADM

## 2017-01-01 RX ORDER — ONDANSETRON 2 MG/ML
INJECTION INTRAMUSCULAR; INTRAVENOUS AS NEEDED
Status: DISCONTINUED | OUTPATIENT
Start: 2017-01-01 | End: 2017-01-01 | Stop reason: HOSPADM

## 2017-01-01 RX ORDER — SODIUM CHLORIDE 0.9 % (FLUSH) 0.9 %
5-10 SYRINGE (ML) INJECTION EVERY 8 HOURS
Status: DISCONTINUED | OUTPATIENT
Start: 2017-01-01 | End: 2017-01-01 | Stop reason: HOSPADM

## 2017-01-01 RX ORDER — POLYETHYLENE GLYCOL 3350 17 G/17G
17 POWDER, FOR SOLUTION ORAL 2 TIMES DAILY
Status: DISCONTINUED | OUTPATIENT
Start: 2017-01-01 | End: 2017-01-01 | Stop reason: HOSPADM

## 2017-01-01 RX ORDER — ONDANSETRON 2 MG/ML
4 INJECTION INTRAMUSCULAR; INTRAVENOUS
Status: COMPLETED | OUTPATIENT
Start: 2017-01-01 | End: 2017-01-01

## 2017-01-01 RX ORDER — SODIUM CHLORIDE 0.9 % (FLUSH) 0.9 %
5-10 SYRINGE (ML) INJECTION AS NEEDED
Status: DISCONTINUED | OUTPATIENT
Start: 2017-01-01 | End: 2017-01-01 | Stop reason: HOSPADM

## 2017-01-01 RX ORDER — BUDESONIDE 0.5 MG/2ML
500 INHALANT ORAL
Status: DISCONTINUED | OUTPATIENT
Start: 2017-01-01 | End: 2017-01-01 | Stop reason: ALTCHOICE

## 2017-01-01 RX ORDER — GLYCOPYRROLATE 0.2 MG/ML
0.2 INJECTION INTRAMUSCULAR; INTRAVENOUS
Status: DISCONTINUED | OUTPATIENT
Start: 2017-01-01 | End: 2017-01-01 | Stop reason: HOSPADM

## 2017-01-01 RX ORDER — SUCCINYLCHOLINE CHLORIDE 20 MG/ML
INJECTION INTRAMUSCULAR; INTRAVENOUS AS NEEDED
Status: DISCONTINUED | OUTPATIENT
Start: 2017-01-01 | End: 2017-01-01 | Stop reason: HOSPADM

## 2017-01-01 RX ORDER — VANCOMYCIN 2 GRAM/500 ML IN 0.9 % SODIUM CHLORIDE INTRAVENOUS
2000 ONCE
Status: COMPLETED | OUTPATIENT
Start: 2017-01-01 | End: 2017-01-01

## 2017-01-01 RX ORDER — NAPROXEN SODIUM 550 MG/1
550 TABLET ORAL 2 TIMES DAILY WITH MEALS
Qty: 10 TAB | Refills: 0 | Status: SHIPPED | OUTPATIENT
Start: 2017-01-01 | End: 2017-01-01

## 2017-01-01 RX ORDER — MORPHINE SULFATE 2 MG/ML
2 INJECTION, SOLUTION INTRAMUSCULAR; INTRAVENOUS
Status: DISCONTINUED | OUTPATIENT
Start: 2017-01-01 | End: 2017-01-01 | Stop reason: HOSPADM

## 2017-01-01 RX ORDER — ASCORBIC ACID 500 MG
500 TABLET ORAL 2 TIMES DAILY
Status: DISCONTINUED | OUTPATIENT
Start: 2017-01-01 | End: 2017-01-01 | Stop reason: HOSPADM

## 2017-01-01 RX ORDER — IPRATROPIUM BROMIDE AND ALBUTEROL SULFATE 2.5; .5 MG/3ML; MG/3ML
3 SOLUTION RESPIRATORY (INHALATION)
Status: DISCONTINUED | OUTPATIENT
Start: 2017-01-01 | End: 2017-01-01 | Stop reason: HOSPADM

## 2017-01-01 RX ORDER — BUPIVACAINE HYDROCHLORIDE AND EPINEPHRINE 2.5; 5 MG/ML; UG/ML
INJECTION, SOLUTION EPIDURAL; INFILTRATION; INTRACAUDAL; PERINEURAL AS NEEDED
Status: DISCONTINUED | OUTPATIENT
Start: 2017-01-01 | End: 2017-01-01 | Stop reason: HOSPADM

## 2017-01-01 RX ORDER — DOXYLAMINE SUCCINATE 25 MG
TABLET ORAL DAILY
Status: DISCONTINUED | OUTPATIENT
Start: 2017-01-01 | End: 2017-01-01 | Stop reason: HOSPADM

## 2017-01-01 RX ORDER — KETOROLAC TROMETHAMINE 30 MG/ML
15 INJECTION, SOLUTION INTRAMUSCULAR; INTRAVENOUS
Status: COMPLETED | OUTPATIENT
Start: 2017-01-01 | End: 2017-01-01

## 2017-01-01 RX ORDER — SODIUM CHLORIDE 9 MG/ML
50 INJECTION, SOLUTION INTRAVENOUS CONTINUOUS
Status: DISCONTINUED | OUTPATIENT
Start: 2017-01-01 | End: 2017-01-01 | Stop reason: HOSPADM

## 2017-01-01 RX ORDER — CEFPODOXIME PROXETIL 200 MG/1
200 TABLET, FILM COATED ORAL EVERY 12 HOURS
Status: DISCONTINUED | OUTPATIENT
Start: 2017-01-01 | End: 2017-01-01 | Stop reason: HOSPADM

## 2017-01-01 RX ORDER — SODIUM CHLORIDE, SODIUM LACTATE, POTASSIUM CHLORIDE, CALCIUM CHLORIDE 600; 310; 30; 20 MG/100ML; MG/100ML; MG/100ML; MG/100ML
150 INJECTION, SOLUTION INTRAVENOUS CONTINUOUS
Status: DISCONTINUED | OUTPATIENT
Start: 2017-01-01 | End: 2017-01-01 | Stop reason: HOSPADM

## 2017-01-01 RX ORDER — INSULIN GLARGINE 100 [IU]/ML
22 INJECTION, SOLUTION SUBCUTANEOUS
Status: DISCONTINUED | OUTPATIENT
Start: 2017-01-01 | End: 2017-01-01 | Stop reason: HOSPADM

## 2017-01-01 RX ORDER — HYDROCODONE BITARTRATE AND ACETAMINOPHEN 10; 325 MG/1; MG/1
1 TABLET ORAL
Status: DISCONTINUED | OUTPATIENT
Start: 2017-01-01 | End: 2017-01-01 | Stop reason: HOSPADM

## 2017-01-01 RX ORDER — METRONIDAZOLE 250 MG/1
500 TABLET ORAL DAILY
Status: DISCONTINUED | OUTPATIENT
Start: 2017-01-01 | End: 2017-01-01

## 2017-01-01 RX ORDER — MORPHINE SULFATE 4 MG/ML
4 INJECTION, SOLUTION INTRAMUSCULAR; INTRAVENOUS
Status: COMPLETED | OUTPATIENT
Start: 2017-01-01 | End: 2017-01-01

## 2017-01-01 RX ORDER — PRAVASTATIN SODIUM 20 MG/1
40 TABLET ORAL
Status: DISCONTINUED | OUTPATIENT
Start: 2017-01-01 | End: 2017-01-01 | Stop reason: HOSPADM

## 2017-01-01 RX ORDER — ACETAMINOPHEN 500 MG
1000 TABLET ORAL
Status: DISCONTINUED | OUTPATIENT
Start: 2017-01-01 | End: 2017-01-01 | Stop reason: HOSPADM

## 2017-01-01 RX ORDER — VANCOMYCIN 1.75 GRAM/500 ML IN 0.9 % SODIUM CHLORIDE INTRAVENOUS
1750 EVERY 24 HOURS
Status: DISCONTINUED | OUTPATIENT
Start: 2017-01-01 | End: 2017-01-01

## 2017-01-01 RX ORDER — INSULIN GLARGINE 100 [IU]/ML
5 INJECTION, SOLUTION SUBCUTANEOUS
Status: DISCONTINUED | OUTPATIENT
Start: 2017-01-01 | End: 2017-01-01

## 2017-01-01 RX ORDER — NALOXONE HYDROCHLORIDE 0.4 MG/ML
0.4 INJECTION, SOLUTION INTRAMUSCULAR; INTRAVENOUS; SUBCUTANEOUS AS NEEDED
Status: DISCONTINUED | OUTPATIENT
Start: 2017-01-01 | End: 2017-01-01 | Stop reason: HOSPADM

## 2017-01-01 RX ORDER — ONDANSETRON 2 MG/ML
4 INJECTION INTRAMUSCULAR; INTRAVENOUS
Status: DISCONTINUED | OUTPATIENT
Start: 2017-01-01 | End: 2017-01-01 | Stop reason: HOSPADM

## 2017-01-01 RX ORDER — PROPOFOL 10 MG/ML
INJECTION, EMULSION INTRAVENOUS AS NEEDED
Status: DISCONTINUED | OUTPATIENT
Start: 2017-01-01 | End: 2017-01-01 | Stop reason: HOSPADM

## 2017-01-01 RX ORDER — HYDRALAZINE HYDROCHLORIDE 20 MG/ML
10 INJECTION INTRAMUSCULAR; INTRAVENOUS
Status: DISCONTINUED | OUTPATIENT
Start: 2017-01-01 | End: 2017-01-01 | Stop reason: HOSPADM

## 2017-01-01 RX ORDER — BUDESONIDE 0.5 MG/2ML
500 INHALANT ORAL
Status: DISCONTINUED | OUTPATIENT
Start: 2017-01-01 | End: 2017-01-01 | Stop reason: HOSPADM

## 2017-01-01 RX ORDER — ACETAMINOPHEN 650 MG/1
650 SUPPOSITORY RECTAL
Status: DISCONTINUED | OUTPATIENT
Start: 2017-01-01 | End: 2017-01-01 | Stop reason: HOSPADM

## 2017-01-01 RX ORDER — SODIUM CHLORIDE 9 MG/ML
125 INJECTION, SOLUTION INTRAVENOUS CONTINUOUS
Status: DISCONTINUED | OUTPATIENT
Start: 2017-01-01 | End: 2017-01-01

## 2017-01-01 RX ORDER — BUDESONIDE 0.5 MG/2ML
500 INHALANT ORAL
Status: DISCONTINUED | OUTPATIENT
Start: 2017-01-01 | End: 2017-01-01

## 2017-01-01 RX ORDER — DOXYLAMINE SUCCINATE 25 MG
TABLET ORAL DAILY
Status: DISCONTINUED | OUTPATIENT
Start: 2017-01-01 | End: 2017-01-01

## 2017-01-01 RX ORDER — ALBUTEROL SULFATE 2.5 MG/.5ML
2.5 SOLUTION RESPIRATORY (INHALATION)
Status: DISCONTINUED | OUTPATIENT
Start: 2017-01-01 | End: 2017-01-01

## 2017-01-01 RX ORDER — HYDROMORPHONE HYDROCHLORIDE 2 MG/ML
0.5 INJECTION, SOLUTION INTRAMUSCULAR; INTRAVENOUS; SUBCUTANEOUS
Status: DISCONTINUED | OUTPATIENT
Start: 2017-01-01 | End: 2017-01-01 | Stop reason: HOSPADM

## 2017-01-01 RX ORDER — METRONIDAZOLE 250 MG/1
500 TABLET ORAL DAILY
Status: DISCONTINUED | OUTPATIENT
Start: 2017-01-01 | End: 2017-01-01 | Stop reason: HOSPADM

## 2017-01-01 RX ADMIN — HYDROMORPHONE HYDROCHLORIDE 1 MG: 1 INJECTION, SOLUTION INTRAMUSCULAR; INTRAVENOUS; SUBCUTANEOUS at 11:36

## 2017-01-01 RX ADMIN — Medication 10 ML: at 21:31

## 2017-01-01 RX ADMIN — HYDROMORPHONE HYDROCHLORIDE 1 MG: 1 INJECTION, SOLUTION INTRAMUSCULAR; INTRAVENOUS; SUBCUTANEOUS at 23:49

## 2017-01-01 RX ADMIN — INSULIN LISPRO 2 UNITS: 100 INJECTION, SOLUTION INTRAVENOUS; SUBCUTANEOUS at 23:23

## 2017-01-01 RX ADMIN — Medication 10 ML: at 21:46

## 2017-01-01 RX ADMIN — POLYETHYLENE GLYCOL 3350 17 G: 17 POWDER, FOR SOLUTION ORAL at 22:16

## 2017-01-01 RX ADMIN — ASPIRIN 81 MG: 81 TABLET, COATED ORAL at 08:13

## 2017-01-01 RX ADMIN — ONDANSETRON 4 MG: 2 INJECTION INTRAMUSCULAR; INTRAVENOUS at 12:13

## 2017-01-01 RX ADMIN — CEFPODOXIME PROXETIL 200 MG: 200 TABLET, FILM COATED ORAL at 22:05

## 2017-01-01 RX ADMIN — Medication 10 ML: at 14:15

## 2017-01-01 RX ADMIN — Medication 10 ML: at 22:16

## 2017-01-01 RX ADMIN — Medication 5 ML: at 16:00

## 2017-01-01 RX ADMIN — VANCOMYCIN HYDROCHLORIDE 1000 MG: 1 INJECTION, POWDER, LYOPHILIZED, FOR SOLUTION INTRAVENOUS at 11:35

## 2017-01-01 RX ADMIN — AZTREONAM 1 G: 1 INJECTION, POWDER, LYOPHILIZED, FOR SOLUTION INTRAMUSCULAR; INTRAVENOUS at 01:07

## 2017-01-01 RX ADMIN — SODIUM CHLORIDE, SODIUM LACTATE, POTASSIUM CHLORIDE, CALCIUM CHLORIDE: 600; 310; 30; 20 INJECTION, SOLUTION INTRAVENOUS at 08:23

## 2017-01-01 RX ADMIN — HYDROMORPHONE HYDROCHLORIDE 0.5 MG: 2 INJECTION, SOLUTION INTRAMUSCULAR; INTRAVENOUS; SUBCUTANEOUS at 10:00

## 2017-01-01 RX ADMIN — HYDROMORPHONE HYDROCHLORIDE 1 MG: 1 INJECTION, SOLUTION INTRAMUSCULAR; INTRAVENOUS; SUBCUTANEOUS at 10:00

## 2017-01-01 RX ADMIN — AMIODARONE HYDROCHLORIDE 200 MG: 200 TABLET ORAL at 17:47

## 2017-01-01 RX ADMIN — AZTREONAM 1 G: 1 INJECTION, POWDER, LYOPHILIZED, FOR SOLUTION INTRAMUSCULAR; INTRAVENOUS at 07:46

## 2017-01-01 RX ADMIN — BUDESONIDE 500 MCG: 0.5 INHALANT RESPIRATORY (INHALATION) at 20:28

## 2017-01-01 RX ADMIN — AMIODARONE HYDROCHLORIDE 200 MG: 200 TABLET ORAL at 08:51

## 2017-01-01 RX ADMIN — Medication 500 MG: at 17:47

## 2017-01-01 RX ADMIN — AZTREONAM 1 G: 1 INJECTION, POWDER, LYOPHILIZED, FOR SOLUTION INTRAMUSCULAR; INTRAVENOUS at 01:16

## 2017-01-01 RX ADMIN — Medication 500 MG: at 08:31

## 2017-01-01 RX ADMIN — OXYCODONE HYDROCHLORIDE AND ACETAMINOPHEN 500 MG: 500 TABLET ORAL at 17:51

## 2017-01-01 RX ADMIN — MORPHINE SULFATE 4 MG: 4 INJECTION, SOLUTION INTRAMUSCULAR; INTRAVENOUS at 14:50

## 2017-01-01 RX ADMIN — Medication 500 MG: at 08:13

## 2017-01-01 RX ADMIN — PRAVASTATIN SODIUM 40 MG: 20 TABLET ORAL at 21:30

## 2017-01-01 RX ADMIN — INSULIN LISPRO 4 UNITS: 100 INJECTION, SOLUTION INTRAVENOUS; SUBCUTANEOUS at 12:24

## 2017-01-01 RX ADMIN — DOCUSATE SODIUM 100 MG: 100 CAPSULE, LIQUID FILLED ORAL at 17:39

## 2017-01-01 RX ADMIN — MORPHINE SULFATE 2 MG: 2 INJECTION, SOLUTION INTRAMUSCULAR; INTRAVENOUS at 23:28

## 2017-01-01 RX ADMIN — SODIUM CHLORIDE 75 ML/HR: 900 INJECTION, SOLUTION INTRAVENOUS at 16:56

## 2017-01-01 RX ADMIN — INSULIN GLARGINE 5 UNITS: 100 INJECTION, SOLUTION SUBCUTANEOUS at 21:45

## 2017-01-01 RX ADMIN — ALBUTEROL SULFATE 2.5 MG: 2.5 SOLUTION RESPIRATORY (INHALATION) at 14:04

## 2017-01-01 RX ADMIN — HYDROMORPHONE HYDROCHLORIDE 1 MG: 1 INJECTION, SOLUTION INTRAMUSCULAR; INTRAVENOUS; SUBCUTANEOUS at 23:32

## 2017-01-01 RX ADMIN — OXYCODONE HYDROCHLORIDE AND ACETAMINOPHEN 500 MG: 500 TABLET ORAL at 08:31

## 2017-01-01 RX ADMIN — INSULIN GLARGINE 22 UNITS: 100 INJECTION, SOLUTION SUBCUTANEOUS at 22:05

## 2017-01-01 RX ADMIN — MORPHINE SULFATE 2 MG: 2 INJECTION, SOLUTION INTRAMUSCULAR; INTRAVENOUS at 22:42

## 2017-01-01 RX ADMIN — INSULIN GLARGINE 5 UNITS: 100 INJECTION, SOLUTION SUBCUTANEOUS at 23:59

## 2017-01-01 RX ADMIN — INSULIN GLARGINE 12 UNITS: 100 INJECTION, SOLUTION SUBCUTANEOUS at 22:14

## 2017-01-01 RX ADMIN — INSULIN LISPRO 6 UNITS: 100 INJECTION, SOLUTION INTRAVENOUS; SUBCUTANEOUS at 13:05

## 2017-01-01 RX ADMIN — Medication 4 MG: at 12:13

## 2017-01-01 RX ADMIN — MORPHINE SULFATE 2 MG: 2 INJECTION, SOLUTION INTRAMUSCULAR; INTRAVENOUS at 05:48

## 2017-01-01 RX ADMIN — INSULIN GLARGINE 5 UNITS: 100 INJECTION, SOLUTION SUBCUTANEOUS at 23:22

## 2017-01-01 RX ADMIN — OXYCODONE HYDROCHLORIDE AND ACETAMINOPHEN 500 MG: 500 TABLET ORAL at 08:03

## 2017-01-01 RX ADMIN — ALBUTEROL SULFATE 2.5 MG: 2.5 SOLUTION RESPIRATORY (INHALATION) at 20:28

## 2017-01-01 RX ADMIN — DOCUSATE SODIUM 100 MG: 100 CAPSULE, LIQUID FILLED ORAL at 17:47

## 2017-01-01 RX ADMIN — HYDROMORPHONE HYDROCHLORIDE 1 MG: 1 INJECTION, SOLUTION INTRAMUSCULAR; INTRAVENOUS; SUBCUTANEOUS at 12:31

## 2017-01-01 RX ADMIN — ALBUTEROL SULFATE 2.5 MG: 2.5 SOLUTION RESPIRATORY (INHALATION) at 08:00

## 2017-01-01 RX ADMIN — ALBUTEROL SULFATE 2.5 MG: 2.5 SOLUTION RESPIRATORY (INHALATION) at 14:08

## 2017-01-01 RX ADMIN — POLYETHYLENE GLYCOL 3350 17 G: 17 POWDER, FOR SOLUTION ORAL at 08:03

## 2017-01-01 RX ADMIN — OXYCODONE HYDROCHLORIDE AND ACETAMINOPHEN 500 MG: 500 TABLET ORAL at 17:47

## 2017-01-01 RX ADMIN — OXYCODONE HYDROCHLORIDE AND ACETAMINOPHEN 500 MG: 500 TABLET ORAL at 08:56

## 2017-01-01 RX ADMIN — FENTANYL 1 PATCH: 25 PATCH TRANSDERMAL at 13:50

## 2017-01-01 RX ADMIN — Medication 10 ML: at 05:23

## 2017-01-01 RX ADMIN — HYDROCODONE BITARTRATE AND ACETAMINOPHEN 1 TABLET: 10; 325 TABLET ORAL at 09:07

## 2017-01-01 RX ADMIN — BUDESONIDE 500 MCG: 0.5 INHALANT RESPIRATORY (INHALATION) at 08:20

## 2017-01-01 RX ADMIN — BUDESONIDE 500 MCG: 0.5 INHALANT RESPIRATORY (INHALATION) at 08:42

## 2017-01-01 RX ADMIN — VANCOMYCIN HYDROCHLORIDE 1750 MG: 10 INJECTION, POWDER, LYOPHILIZED, FOR SOLUTION INTRAVENOUS at 11:01

## 2017-01-01 RX ADMIN — HALOPERIDOL LACTATE 2 MG: 5 INJECTION, SOLUTION INTRAMUSCULAR at 05:49

## 2017-01-01 RX ADMIN — AMIODARONE HYDROCHLORIDE 200 MG: 200 TABLET ORAL at 17:00

## 2017-01-01 RX ADMIN — HALOPERIDOL LACTATE 2 MG: 5 INJECTION, SOLUTION INTRAMUSCULAR at 03:38

## 2017-01-01 RX ADMIN — ONDANSETRON HYDROCHLORIDE 4 MG: 2 SOLUTION INTRAMUSCULAR; INTRAVENOUS at 17:28

## 2017-01-01 RX ADMIN — ONDANSETRON HYDROCHLORIDE 4 MG: 2 SOLUTION INTRAMUSCULAR; INTRAVENOUS at 19:29

## 2017-01-01 RX ADMIN — ALBUTEROL SULFATE 2.5 MG: 2.5 SOLUTION RESPIRATORY (INHALATION) at 19:59

## 2017-01-01 RX ADMIN — ALBUTEROL SULFATE 2.5 MG: 2.5 SOLUTION RESPIRATORY (INHALATION) at 20:50

## 2017-01-01 RX ADMIN — INSULIN LISPRO 2 UNITS: 100 INJECTION, SOLUTION INTRAVENOUS; SUBCUTANEOUS at 17:52

## 2017-01-01 RX ADMIN — ALBUTEROL SULFATE 2.5 MG: 2.5 SOLUTION RESPIRATORY (INHALATION) at 14:15

## 2017-01-01 RX ADMIN — BUDESONIDE 500 MCG: 0.5 INHALANT RESPIRATORY (INHALATION) at 08:00

## 2017-01-01 RX ADMIN — MORPHINE SULFATE 2 MG: 2 INJECTION, SOLUTION INTRAMUSCULAR; INTRAVENOUS at 03:37

## 2017-01-01 RX ADMIN — MORPHINE SULFATE 2 MG: 2 INJECTION, SOLUTION INTRAMUSCULAR; INTRAVENOUS at 02:49

## 2017-01-01 RX ADMIN — ALBUTEROL SULFATE 2.5 MG: 2.5 SOLUTION RESPIRATORY (INHALATION) at 13:10

## 2017-01-01 RX ADMIN — CLINDAMYCIN PHOSPHATE 600 MG: 150 INJECTION, SOLUTION, CONCENTRATE INTRAVENOUS at 14:13

## 2017-01-01 RX ADMIN — INSULIN LISPRO 4 UNITS: 100 INJECTION, SOLUTION INTRAVENOUS; SUBCUTANEOUS at 12:27

## 2017-01-01 RX ADMIN — POLYETHYLENE GLYCOL 3350 17 G: 17 POWDER, FOR SOLUTION ORAL at 08:15

## 2017-01-01 RX ADMIN — DOCUSATE SODIUM 100 MG: 100 CAPSULE, LIQUID FILLED ORAL at 08:13

## 2017-01-01 RX ADMIN — CEFTRIAXONE 1 G: 1 INJECTION, POWDER, FOR SOLUTION INTRAMUSCULAR; INTRAVENOUS at 13:27

## 2017-01-01 RX ADMIN — DOCUSATE SODIUM 100 MG: 100 CAPSULE, LIQUID FILLED ORAL at 08:03

## 2017-01-01 RX ADMIN — BUDESONIDE 500 MCG: 0.5 INHALANT RESPIRATORY (INHALATION) at 20:57

## 2017-01-01 RX ADMIN — ROCURONIUM BROMIDE 5 MG: 10 INJECTION, SOLUTION INTRAVENOUS at 08:31

## 2017-01-01 RX ADMIN — HYDROMORPHONE HYDROCHLORIDE 0.5 MG: 2 INJECTION, SOLUTION INTRAMUSCULAR; INTRAVENOUS; SUBCUTANEOUS at 09:55

## 2017-01-01 RX ADMIN — ALBUTEROL SULFATE 2.5 MG: 2.5 SOLUTION RESPIRATORY (INHALATION) at 08:42

## 2017-01-01 RX ADMIN — HALOPERIDOL LACTATE 2 MG: 5 INJECTION, SOLUTION INTRAMUSCULAR at 22:42

## 2017-01-01 RX ADMIN — OXYCODONE HYDROCHLORIDE AND ACETAMINOPHEN 500 MG: 500 TABLET ORAL at 17:39

## 2017-01-01 RX ADMIN — OXYCODONE HYDROCHLORIDE AND ACETAMINOPHEN 500 MG: 500 TABLET ORAL at 08:15

## 2017-01-01 RX ADMIN — POLYETHYLENE GLYCOL 3350 17 G: 17 POWDER, FOR SOLUTION ORAL at 22:05

## 2017-01-01 RX ADMIN — AZTREONAM 1 G: 1 INJECTION, POWDER, LYOPHILIZED, FOR SOLUTION INTRAMUSCULAR; INTRAVENOUS at 08:35

## 2017-01-01 RX ADMIN — VANCOMYCIN HYDROCHLORIDE 1000 MG: 1 INJECTION, POWDER, LYOPHILIZED, FOR SOLUTION INTRAVENOUS at 10:34

## 2017-01-01 RX ADMIN — PRAVASTATIN SODIUM 40 MG: 20 TABLET ORAL at 22:16

## 2017-01-01 RX ADMIN — SODIUM CHLORIDE 125 ML/HR: 900 INJECTION, SOLUTION INTRAVENOUS at 11:07

## 2017-01-01 RX ADMIN — INSULIN LISPRO 4 UNITS: 100 INJECTION, SOLUTION INTRAVENOUS; SUBCUTANEOUS at 06:00

## 2017-01-01 RX ADMIN — AZTREONAM 1 G: 1 INJECTION, POWDER, LYOPHILIZED, FOR SOLUTION INTRAMUSCULAR; INTRAVENOUS at 23:40

## 2017-01-01 RX ADMIN — Medication 10 ML: at 06:03

## 2017-01-01 RX ADMIN — INSULIN LISPRO 2 UNITS: 100 INJECTION, SOLUTION INTRAVENOUS; SUBCUTANEOUS at 13:37

## 2017-01-01 RX ADMIN — ASPIRIN 81 MG: 81 TABLET, COATED ORAL at 08:56

## 2017-01-01 RX ADMIN — PROPOFOL 110 MG: 10 INJECTION, EMULSION INTRAVENOUS at 08:31

## 2017-01-01 RX ADMIN — POLYETHYLENE GLYCOL 3350 17 G: 17 POWDER, FOR SOLUTION ORAL at 08:31

## 2017-01-01 RX ADMIN — ALBUTEROL SULFATE 2.5 MG: 2.5 SOLUTION RESPIRATORY (INHALATION) at 02:21

## 2017-01-01 RX ADMIN — Medication 10 ML: at 05:49

## 2017-01-01 RX ADMIN — DOCUSATE SODIUM 100 MG: 100 CAPSULE, LIQUID FILLED ORAL at 08:56

## 2017-01-01 RX ADMIN — INSULIN LISPRO 6 UNITS: 100 INJECTION, SOLUTION INTRAVENOUS; SUBCUTANEOUS at 17:50

## 2017-01-01 RX ADMIN — HYDROMORPHONE HYDROCHLORIDE 0.5 MG: 2 INJECTION, SOLUTION INTRAMUSCULAR; INTRAVENOUS; SUBCUTANEOUS at 09:50

## 2017-01-01 RX ADMIN — AZTREONAM 1 G: 1 INJECTION, POWDER, LYOPHILIZED, FOR SOLUTION INTRAMUSCULAR; INTRAVENOUS at 07:51

## 2017-01-01 RX ADMIN — AMIODARONE HYDROCHLORIDE 200 MG: 200 TABLET ORAL at 17:39

## 2017-01-01 RX ADMIN — Medication 10 ML: at 22:04

## 2017-01-01 RX ADMIN — HALOPERIDOL LACTATE 2 MG: 5 INJECTION, SOLUTION INTRAMUSCULAR at 20:27

## 2017-01-01 RX ADMIN — AMIODARONE HYDROCHLORIDE 200 MG: 200 TABLET ORAL at 08:15

## 2017-01-01 RX ADMIN — INSULIN LISPRO 2 UNITS: 100 INJECTION, SOLUTION INTRAVENOUS; SUBCUTANEOUS at 06:36

## 2017-01-01 RX ADMIN — INSULIN LISPRO 2 UNITS: 100 INJECTION, SOLUTION INTRAVENOUS; SUBCUTANEOUS at 05:58

## 2017-01-01 RX ADMIN — TUBERCULIN PURIFIED PROTEIN DERIVATIVE 5 UNITS: 5 INJECTION, SOLUTION INTRADERMAL at 18:00

## 2017-01-01 RX ADMIN — Medication 10 ML: at 21:55

## 2017-01-01 RX ADMIN — AZTREONAM 1 G: 1 INJECTION, POWDER, LYOPHILIZED, FOR SOLUTION INTRAMUSCULAR; INTRAVENOUS at 15:58

## 2017-01-01 RX ADMIN — KETOROLAC TROMETHAMINE 15 MG: 30 INJECTION, SOLUTION INTRAMUSCULAR at 12:13

## 2017-01-01 RX ADMIN — LOSARTAN POTASSIUM 25 MG: 25 TABLET ORAL at 08:15

## 2017-01-01 RX ADMIN — ONDANSETRON HYDROCHLORIDE 4 MG: 2 SOLUTION INTRAMUSCULAR; INTRAVENOUS at 07:47

## 2017-01-01 RX ADMIN — LOSARTAN POTASSIUM 25 MG: 25 TABLET ORAL at 08:31

## 2017-01-01 RX ADMIN — PRAVASTATIN SODIUM 40 MG: 20 TABLET ORAL at 21:45

## 2017-01-01 RX ADMIN — VANCOMYCIN HYDROCHLORIDE 1000 MG: 1 INJECTION, POWDER, LYOPHILIZED, FOR SOLUTION INTRAVENOUS at 22:16

## 2017-01-01 RX ADMIN — HALOPERIDOL LACTATE 2 MG: 5 INJECTION, SOLUTION INTRAMUSCULAR at 23:28

## 2017-01-01 RX ADMIN — ONDANSETRON HYDROCHLORIDE 4 MG: 2 SOLUTION INTRAMUSCULAR; INTRAVENOUS at 01:46

## 2017-01-01 RX ADMIN — AMIODARONE HYDROCHLORIDE 200 MG: 200 TABLET ORAL at 08:03

## 2017-01-01 RX ADMIN — AZTREONAM 1 G: 1 INJECTION, POWDER, LYOPHILIZED, FOR SOLUTION INTRAMUSCULAR; INTRAVENOUS at 15:56

## 2017-01-01 RX ADMIN — BUDESONIDE 500 MCG: 0.5 INHALANT RESPIRATORY (INHALATION) at 19:50

## 2017-01-01 RX ADMIN — INSULIN LISPRO 2 UNITS: 100 INJECTION, SOLUTION INTRAVENOUS; SUBCUTANEOUS at 17:51

## 2017-01-01 RX ADMIN — HYDROMORPHONE HYDROCHLORIDE 1 MG: 1 INJECTION, SOLUTION INTRAMUSCULAR; INTRAVENOUS; SUBCUTANEOUS at 16:52

## 2017-01-01 RX ADMIN — SODIUM CHLORIDE 1000 ML: 900 INJECTION, SOLUTION INTRAVENOUS at 13:27

## 2017-01-01 RX ADMIN — ONDANSETRON 4 MG: 2 INJECTION INTRAMUSCULAR; INTRAVENOUS at 14:50

## 2017-01-01 RX ADMIN — INSULIN LISPRO 4 UNITS: 100 INJECTION, SOLUTION INTRAVENOUS; SUBCUTANEOUS at 23:42

## 2017-01-01 RX ADMIN — Medication 10 ML: at 22:06

## 2017-01-01 RX ADMIN — DOCUSATE SODIUM 100 MG: 100 CAPSULE, LIQUID FILLED ORAL at 08:52

## 2017-01-01 RX ADMIN — SODIUM CHLORIDE 125 ML/HR: 900 INJECTION, SOLUTION INTRAVENOUS at 23:40

## 2017-01-01 RX ADMIN — AMIODARONE HYDROCHLORIDE 200 MG: 200 TABLET ORAL at 18:05

## 2017-01-01 RX ADMIN — Medication 10 ML: at 06:02

## 2017-01-01 RX ADMIN — AZTREONAM 1 G: 1 INJECTION, POWDER, LYOPHILIZED, FOR SOLUTION INTRAMUSCULAR; INTRAVENOUS at 23:16

## 2017-01-01 RX ADMIN — AZTREONAM 1 G: 1 INJECTION, POWDER, LYOPHILIZED, FOR SOLUTION INTRAMUSCULAR; INTRAVENOUS at 08:33

## 2017-01-01 RX ADMIN — HYDROCODONE BITARTRATE AND ACETAMINOPHEN 1 TABLET: 10; 325 TABLET ORAL at 16:50

## 2017-01-01 RX ADMIN — MORPHINE SULFATE 2 MG: 2 INJECTION, SOLUTION INTRAMUSCULAR; INTRAVENOUS at 20:28

## 2017-01-01 RX ADMIN — Medication 500 MG: at 17:46

## 2017-01-01 RX ADMIN — ASPIRIN 81 MG: 81 TABLET, COATED ORAL at 08:31

## 2017-01-01 RX ADMIN — FENTANYL CITRATE 100 MCG: 50 INJECTION, SOLUTION INTRAMUSCULAR; INTRAVENOUS at 08:30

## 2017-01-01 RX ADMIN — Medication 500 MG: at 17:51

## 2017-01-01 RX ADMIN — BUDESONIDE 500 MCG: 0.5 INHALANT RESPIRATORY (INHALATION) at 19:59

## 2017-01-01 RX ADMIN — PRAVASTATIN SODIUM 40 MG: 20 TABLET ORAL at 22:05

## 2017-01-01 RX ADMIN — ALBUTEROL SULFATE 2.5 MG: 2.5 SOLUTION RESPIRATORY (INHALATION) at 20:57

## 2017-01-01 RX ADMIN — POLYETHYLENE GLYCOL 3350 17 G: 17 POWDER, FOR SOLUTION ORAL at 21:45

## 2017-01-01 RX ADMIN — VANCOMYCIN HYDROCHLORIDE 1000 MG: 1 INJECTION, POWDER, LYOPHILIZED, FOR SOLUTION INTRAVENOUS at 23:01

## 2017-01-01 RX ADMIN — Medication 10 ML: at 05:59

## 2017-01-01 RX ADMIN — AZTREONAM 1 G: 1 INJECTION, POWDER, LYOPHILIZED, FOR SOLUTION INTRAMUSCULAR; INTRAVENOUS at 00:01

## 2017-01-01 RX ADMIN — MORPHINE SULFATE 2 MG: 2 INJECTION, SOLUTION INTRAMUSCULAR; INTRAVENOUS at 10:24

## 2017-01-01 RX ADMIN — ALBUTEROL SULFATE 2.5 MG: 2.5 SOLUTION RESPIRATORY (INHALATION) at 13:55

## 2017-01-01 RX ADMIN — AMIODARONE HYDROCHLORIDE 200 MG: 200 TABLET ORAL at 08:56

## 2017-01-01 RX ADMIN — VANCOMYCIN HYDROCHLORIDE 1750 MG: 10 INJECTION, POWDER, LYOPHILIZED, FOR SOLUTION INTRAVENOUS at 11:55

## 2017-01-01 RX ADMIN — AZTREONAM 1 G: 1 INJECTION, POWDER, LYOPHILIZED, FOR SOLUTION INTRAMUSCULAR; INTRAVENOUS at 08:52

## 2017-01-01 RX ADMIN — INSULIN LISPRO 2 UNITS: 100 INJECTION, SOLUTION INTRAVENOUS; SUBCUTANEOUS at 18:04

## 2017-01-01 RX ADMIN — OXYCODONE HYDROCHLORIDE AND ACETAMINOPHEN 500 MG: 500 TABLET ORAL at 08:52

## 2017-01-01 RX ADMIN — INSULIN LISPRO 2 UNITS: 100 INJECTION, SOLUTION INTRAVENOUS; SUBCUTANEOUS at 00:23

## 2017-01-01 RX ADMIN — LOSARTAN POTASSIUM 25 MG: 25 TABLET ORAL at 08:03

## 2017-01-01 RX ADMIN — HALOPERIDOL LACTATE 2 MG: 5 INJECTION, SOLUTION INTRAMUSCULAR at 00:20

## 2017-01-01 RX ADMIN — HYDROCODONE BITARTRATE AND ACETAMINOPHEN 1 TABLET: 10; 325 TABLET ORAL at 08:56

## 2017-01-01 RX ADMIN — HYDROMORPHONE HYDROCHLORIDE 1 MG: 1 INJECTION, SOLUTION INTRAMUSCULAR; INTRAVENOUS; SUBCUTANEOUS at 08:00

## 2017-01-01 RX ADMIN — MORPHINE SULFATE 2 MG: 2 INJECTION, SOLUTION INTRAMUSCULAR; INTRAVENOUS at 00:20

## 2017-01-01 RX ADMIN — VANCOMYCIN HYDROCHLORIDE 2000 MG: 10 INJECTION, POWDER, LYOPHILIZED, FOR SOLUTION INTRAVENOUS at 17:47

## 2017-01-01 RX ADMIN — ALBUTEROL SULFATE 2.5 MG: 2.5 SOLUTION RESPIRATORY (INHALATION) at 07:45

## 2017-01-01 RX ADMIN — DOCUSATE SODIUM 100 MG: 100 CAPSULE, LIQUID FILLED ORAL at 17:51

## 2017-01-01 RX ADMIN — PRAVASTATIN SODIUM 40 MG: 20 TABLET ORAL at 22:03

## 2017-01-01 RX ADMIN — AZTREONAM 1 G: 1 INJECTION, POWDER, LYOPHILIZED, FOR SOLUTION INTRAMUSCULAR; INTRAVENOUS at 16:54

## 2017-01-01 RX ADMIN — ASPIRIN 81 MG: 81 TABLET, COATED ORAL at 08:52

## 2017-01-01 RX ADMIN — METRONIDAZOLE 500 MG: 250 TABLET ORAL at 20:58

## 2017-01-01 RX ADMIN — DOCUSATE SODIUM 100 MG: 100 CAPSULE, LIQUID FILLED ORAL at 17:00

## 2017-01-01 RX ADMIN — SODIUM CHLORIDE 125 ML/HR: 900 INJECTION, SOLUTION INTRAVENOUS at 11:04

## 2017-01-01 RX ADMIN — VANCOMYCIN HYDROCHLORIDE 1750 MG: 10 INJECTION, POWDER, LYOPHILIZED, FOR SOLUTION INTRAVENOUS at 23:41

## 2017-01-01 RX ADMIN — Medication 500 MG: at 17:00

## 2017-01-01 RX ADMIN — OXYCODONE HYDROCHLORIDE AND ACETAMINOPHEN 500 MG: 500 TABLET ORAL at 17:00

## 2017-01-01 RX ADMIN — SUCCINYLCHOLINE CHLORIDE 160 MG: 20 INJECTION INTRAMUSCULAR; INTRAVENOUS at 08:32

## 2017-01-01 RX ADMIN — PRAVASTATIN SODIUM 40 MG: 20 TABLET ORAL at 21:56

## 2017-01-01 RX ADMIN — ALBUTEROL SULFATE 2.5 MG: 2.5 SOLUTION RESPIRATORY (INHALATION) at 19:54

## 2017-01-01 RX ADMIN — HYDROMORPHONE HYDROCHLORIDE 1 MG: 1 INJECTION, SOLUTION INTRAMUSCULAR; INTRAVENOUS; SUBCUTANEOUS at 05:29

## 2017-01-01 RX ADMIN — ALBUTEROL SULFATE 2.5 MG: 2.5 SOLUTION RESPIRATORY (INHALATION) at 08:20

## 2017-01-01 RX ADMIN — HYDROMORPHONE HYDROCHLORIDE 1 MG: 1 INJECTION, SOLUTION INTRAMUSCULAR; INTRAVENOUS; SUBCUTANEOUS at 16:23

## 2017-01-01 RX ADMIN — INSULIN LISPRO 2 UNITS: 100 INJECTION, SOLUTION INTRAVENOUS; SUBCUTANEOUS at 18:00

## 2017-01-01 RX ADMIN — INSULIN LISPRO 2 UNITS: 100 INJECTION, SOLUTION INTRAVENOUS; SUBCUTANEOUS at 23:35

## 2017-01-01 RX ADMIN — AZTREONAM 1 G: 1 INJECTION, POWDER, LYOPHILIZED, FOR SOLUTION INTRAMUSCULAR; INTRAVENOUS at 08:55

## 2017-01-01 RX ADMIN — VANCOMYCIN HYDROCHLORIDE 1750 MG: 10 INJECTION, POWDER, LYOPHILIZED, FOR SOLUTION INTRAVENOUS at 17:40

## 2017-01-01 RX ADMIN — BUDESONIDE 500 MCG: 0.5 INHALANT RESPIRATORY (INHALATION) at 07:45

## 2017-01-01 RX ADMIN — ONDANSETRON 4 MG: 2 INJECTION INTRAMUSCULAR; INTRAVENOUS at 09:24

## 2017-01-01 RX ADMIN — AZTREONAM 1 G: 1 INJECTION, POWDER, LYOPHILIZED, FOR SOLUTION INTRAMUSCULAR; INTRAVENOUS at 16:58

## 2017-01-01 RX ADMIN — HALOPERIDOL LACTATE 2 MG: 5 INJECTION, SOLUTION INTRAMUSCULAR at 02:49

## 2017-01-01 RX ADMIN — DOCUSATE SODIUM 100 MG: 100 CAPSULE, LIQUID FILLED ORAL at 08:31

## 2017-01-01 RX ADMIN — HYDROMORPHONE HYDROCHLORIDE 1 MG: 1 INJECTION, SOLUTION INTRAMUSCULAR; INTRAVENOUS; SUBCUTANEOUS at 14:02

## 2017-01-01 RX ADMIN — Medication 500 MG: at 08:51

## 2017-01-01 RX ADMIN — Medication 500 MG: at 17:39

## 2017-01-01 RX ADMIN — HYDROMORPHONE HYDROCHLORIDE 1 MG: 1 INJECTION, SOLUTION INTRAMUSCULAR; INTRAVENOUS; SUBCUTANEOUS at 21:58

## 2017-01-01 RX ADMIN — ALBUTEROL SULFATE 2.5 MG: 2.5 SOLUTION RESPIRATORY (INHALATION) at 14:00

## 2017-01-01 RX ADMIN — Medication 10 ML: at 21:54

## 2017-01-01 RX ADMIN — ASPIRIN 81 MG: 81 TABLET, COATED ORAL at 08:03

## 2017-01-01 RX ADMIN — AZTREONAM 1 G: 1 INJECTION, POWDER, LYOPHILIZED, FOR SOLUTION INTRAMUSCULAR; INTRAVENOUS at 15:36

## 2017-01-01 RX ADMIN — INSULIN LISPRO 2 UNITS: 100 INJECTION, SOLUTION INTRAVENOUS; SUBCUTANEOUS at 12:21

## 2017-01-01 RX ADMIN — MORPHINE SULFATE 2 MG: 2 INJECTION, SOLUTION INTRAMUSCULAR; INTRAVENOUS at 15:37

## 2017-01-01 RX ADMIN — INSULIN LISPRO 4 UNITS: 100 INJECTION, SOLUTION INTRAVENOUS; SUBCUTANEOUS at 05:47

## 2017-01-01 RX ADMIN — LIDOCAINE HYDROCHLORIDE 80 MG: 20 INJECTION, SOLUTION EPIDURAL; INFILTRATION; INTRACAUDAL; PERINEURAL at 08:31

## 2017-01-01 RX ADMIN — SODIUM CHLORIDE 125 ML/HR: 900 INJECTION, SOLUTION INTRAVENOUS at 23:58

## 2017-01-01 RX ADMIN — POLYETHYLENE GLYCOL 3350 17 G: 17 POWDER, FOR SOLUTION ORAL at 22:03

## 2017-01-01 RX ADMIN — HYDROMORPHONE HYDROCHLORIDE 1 MG: 1 INJECTION, SOLUTION INTRAMUSCULAR; INTRAVENOUS; SUBCUTANEOUS at 18:14

## 2017-01-01 RX ADMIN — INSULIN LISPRO 2 UNITS: 100 INJECTION, SOLUTION INTRAVENOUS; SUBCUTANEOUS at 06:13

## 2017-01-01 RX ADMIN — Medication 500 MG: at 08:55

## 2017-01-01 RX ADMIN — BUDESONIDE 500 MCG: 0.5 INHALANT RESPIRATORY (INHALATION) at 20:50

## 2017-01-01 RX ADMIN — LOSARTAN POTASSIUM 25 MG: 25 TABLET ORAL at 08:51

## 2017-01-01 RX ADMIN — HYDROMORPHONE HYDROCHLORIDE 1 MG: 1 INJECTION, SOLUTION INTRAMUSCULAR; INTRAVENOUS; SUBCUTANEOUS at 13:32

## 2017-01-01 RX ADMIN — Medication 10 ML: at 06:04

## 2017-01-01 RX ADMIN — AMIODARONE HYDROCHLORIDE 200 MG: 200 TABLET ORAL at 17:51

## 2017-01-01 RX ADMIN — HYDROMORPHONE HYDROCHLORIDE 0.5 MG: 2 INJECTION, SOLUTION INTRAMUSCULAR; INTRAVENOUS; SUBCUTANEOUS at 09:45

## 2017-01-01 RX ADMIN — HYDROCODONE BITARTRATE AND ACETAMINOPHEN 1 TABLET: 10; 325 TABLET ORAL at 16:10

## 2017-01-01 RX ADMIN — Medication 500 MG: at 08:03

## 2017-01-01 RX ADMIN — CEFPODOXIME PROXETIL 200 MG: 200 TABLET, FILM COATED ORAL at 08:13

## 2017-01-01 RX ADMIN — MICONAZOLE NITRATE: 20 CREAM TOPICAL at 21:09

## 2017-01-01 RX ADMIN — LOSARTAN POTASSIUM 25 MG: 25 TABLET ORAL at 08:56

## 2017-01-01 RX ADMIN — INSULIN GLARGINE 5 UNITS: 100 INJECTION, SOLUTION SUBCUTANEOUS at 22:00

## 2017-01-01 RX ADMIN — AMIODARONE HYDROCHLORIDE 200 MG: 200 TABLET ORAL at 08:31

## 2017-02-03 NOTE — PROGRESS NOTES
Visited with patient. Daughter Jared Riojas ([phone]) is at bedside. Patient states she has not been able to walk since this past Thanksgiving after she fell and broke her hip. Was ambulatory with a rollater and at home prior to this. States she was at [facility] for 1 1/2 wks and then was discharged to rehab at [facility]. After a month at [facility] she was transferred to [facility] skilled unit as her insurance said [facility] was out of network. States therapy tries every day to get her up and walking but her knees are 'like water' and will not hold her up. States the wound RN also comes to her daily and does dressing changes on her decubitus ulcer. Dr Lee Smoker notified.

## 2017-02-03 NOTE — DISCHARGE INSTRUCTIONS
Pressure Sores: Care Instructions  Your Care Instructions    A pressure sore is an injury to the skin caused by constant pressure. These sores--also called decubitus ulcers or bedsores--may happen when you lie in bed or sit in a wheelchair for a long time. The constant pressure blocks the blood supply to the skin. This causes skin cells to die and creates a sore. Pressure sores usually occur over bony areas, such as the hips, lower back, elbows, heels, and shoulders. They also can occur in places where the skin folds over on itself. You may have mild redness or open sores that are harder to heal.  Good care at home can help heal pressure sores. You or your caregiver needs to check your skin every day for sores. You need good nutrition and plenty of fluids to keep your skin healthy and prevent new pressure sores. Follow-up care is a key part of your treatment and safety. Be sure to make and go to all appointments, and call your doctor if you are having problems. It's also a good idea to know your test results and keep a list of the medicines you take. How can you care for yourself at home? · If your doctor prescribed a medicated ointment or cream, use it exactly as prescribed. Call your doctor if you think you are having a problem with your medicine. · Wash pressure sores every day, or as often as your doctor recommends. Most tap water is safe, but follow the advice of your doctor or nurse. He or she may recommend that you use a saline solution. This is a salt and water solution that you can buy over the counter. · Put on bandages as your doctor or wound care specialist says. · Keep healthy tissue around the sore clean and dry. · Check your skin every day for sores (or have a caregiver do it). · If you know what is causing the pressure that caused the sore, find a way to remove that pressure. To prevent pressure sores  · Change your position or have your caregiver help you change your position often. You may need to do this every 2 hours if you are in bed or every 15 minutes if you are in a wheelchair. This lowers the chance of making sores worse and getting new sores. · Use special mattresses or other support. These may include low-pressure mattresses or cushions made of foam that can be filled with air, water, beads, or fiber. · Eat healthy foods with plenty of protein to help heal damaged skin and to help new skin grow. · Try to stay at a healthy weight. Being overweight can lead to more pressure on your skin. · Do not slide across sheets or slump in a chair or bed. · Do not smoke. Smoking dries the skin and reduces its blood supply. If you need help quitting, talk to your doctor about stop-smoking programs and medicines. These can increase your chances of quitting for good. When should you call for help? Call your doctor now or seek immediate medical care if:  · You have signs of infection, such as:  ¨ Increased pain, swelling, warmth, or redness. ¨ Red streaks leading from the sore. ¨ Pus draining from the sore. ¨ A fever. Watch closely for changes in your health, and be sure to contact your doctor if:  · Your pressure sores are not healing. · You have new pressure sores. · You need help changing positions in bed or in a chair. · Your caregiver needs help to move you. Where can you learn more? Go to http://caitie-charu.info/. Enter F114 in the search box to learn more about \"Pressure Sores: Care Instructions. \"  Current as of: June 4, 2016  Content Version: 11.1  © 4133-2408 [employer]. Care instructions adapted under license by [employer] (which disclaims liability or warranty for this information). If you have questions about a medical condition or this instruction, always ask your healthcare professional. [address] any warranty or liability for your use of this information.          Musculoskeletal Chest Pain: Care Instructions  Your Care Instructions  Chest pain is not always a sign that something is wrong with your heart or that you have another serious problem. The doctor thinks your chest pain is caused by strained muscles or ligaments, inflamed chest cartilage, or another problem in your chest, rather than by your heart. You may need more tests to find the cause of your chest pain. Follow-up care is a key part of your treatment and safety. Be sure to make and go to all appointments, and call your doctor if you are having problems. Its also a good idea to know your test results and keep a list of the medicines you take. How can you care for yourself at home? · Take pain medicines exactly as directed. ¨ If the doctor gave you a prescription medicine for pain, take it as prescribed. ¨ If you are not taking a prescription pain medicine, ask your doctor if you can take an over-the-counter medicine. · Rest and protect the sore area. · Stop, change, or take a break from any activity that may be causing your pain or soreness. · Put ice or a cold pack on the sore area for 10 to 20 minutes at a time. Try to do this every 1 to 2 hours for the next 3 days (when you are awake) or until the swelling goes down. Put a thin cloth between the ice and your skin. · After 2 or 3 days, apply a heating pad set on low or a warm cloth to the area that hurts. Some doctors suggest that you go back and forth between hot and cold. · Do not wrap or tape your ribs for support. This may cause you to take smaller breaths, which could increase your risk of lung problems. · Mentholated creams such as Bengay or Icy Hot may soothe sore muscles. Follow the instructions on the package. · Follow your doctor's instructions for exercising. · Gentle stretching and massage may help you get better faster. Stretch slowly to the point just before pain begins, and hold the stretch for at least 15 to 30 seconds. Do this 3 or 4 times a day.  Stretch just after you have applied heat. · As your pain gets better, slowly return to your normal activities. Any increased pain may be a sign that you need to rest a while longer. When should you call for help? Call 911 anytime you think you may need emergency care. For example, call if:  · You have chest pain or pressure. This may occur with:  ¨ Sweating. ¨ Shortness of breath. ¨ Nausea or vomiting. ¨ Pain that spreads from the chest to the neck, jaw, or one or both shoulders or arms. ¨ Dizziness or lightheadedness. ¨ A fast or uneven pulse. After calling 911, chew 1 adult-strength aspirin. Wait for an ambulance. Do not try to drive yourself. · You have sudden chest pain and shortness of breath, or you cough up blood. Call your doctor now or seek immediate medical care if:  · You have any trouble breathing. · Your chest pain gets worse. · Your chest pain occurs consistently with exercise and is relieved by rest.  Watch closely for changes in your health, and be sure to contact your doctor if:  · Your chest pain does not get better after 1 week. Where can you learn more? Go to http://caitie-charu.info/. Enter V293 in the search box to learn more about \"Musculoskeletal Chest Pain: Care Instructions. \"  Current as of: May 27, 2016  Content Version: 11.1  © 8146-7838 [employer]. Care instructions adapted under license by [employer] (which disclaims liability or warranty for this information). If you have questions about a medical condition or this instruction, always ask your healthcare professional. [place] [address] any warranty or liability for your use of this information.

## 2017-02-03 NOTE — ED NOTES
TRANSFER - OUT REPORT:    Verbal report given to [facility] on Rolf Keshawn  being transferred to [facility] at [facility] for routine progression of care       Report consisted of patients Situation, Background, Assessment and   Recommendations(SBAR). Information from the following report(s) SBAR, ED Summary and MAR was reviewed with the receiving nurse. Lines:   Peripheral IV 02/03/17 Left Antecubital (Active)       Peripheral IV 02/03/17 Right Hand (Active)        Opportunity for questions and clarification was provided. Patient transported with:   [facility] ambulance service.

## 2017-02-06 NOTE — PROGRESS NOTES
Financial aid application left in my mailbox for patient. No supporting documentation. Called patient and daughter, Hawa Ayala, answered. States they did not mean to leave it here and are aware they need supporting documentation. States she will come back and pick it up.

## 2017-02-07 NOTE — ED PROVIDER NOTES
Patient is a 66 y.o. female presenting with chest pain. The history is provided by the patient. Chest Pain (Angina)    This is a new problem. The current episode started yesterday. The problem has not changed since onset. The problem occurs constantly. The pain is associated with normal activity. The pain is present in the left side. The quality of the pain is described as sharp. The pain does not radiate. The symptoms are aggravated by deep breathing (cough). Associated symptoms include cough and shortness of breath. Pertinent negatives include no abdominal pain, no back pain, no diaphoresis, no dizziness, no fever, no headaches, no nausea, no palpitations and no vomiting. She has tried nothing for the symptoms. Risk factors include cardiac disease.         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    Peripheral neuropathy (HCC)      both feet and lower legs    Shortness of breath on exertion      managed with medication     Type 2 diabetes mellitus (HCC) Dx 1991     insulin dependent/ Avg / low BS symptoms @ 60/ last A1C 7    Urinary frequency     Urinary incontinence        Past Surgical History:   Procedure Laterality Date    Pr removal of heel spur Bilateral     Hx hysterectomy      Vascular surgery procedure unlist Right 12/28/2012     removed from arm    Hx orthopaedic Bilateral      tennis elbow surgery    Hx amputation Right      Great toe and 2nd toe    Hx knee replacement Left     Hx refractive surgery Bilateral     Hx tonsil and adenoidectomy           Family History:   Problem Relation Age of Onset    Heart Disease Mother     Diabetes Father        Social History     Social History    Marital status:      Spouse name: N/A    Number of children: N/A    Years of education: N/A     Occupational History    Not on file. Social History Main Topics    Smoking status: Former Smoker     Years: 2.00    Smokeless tobacco: Never Used      Comment: social smoking during teenage years   Elisha Leaks Alcohol use No    Drug use: No    Sexual activity: Not on file     Other Topics Concern    Not on file     Social History Narrative    Has 2 cats    Was a Plastic     Mother, brother, uncle with TB and             ALLERGIES: Penicillins and Sulfa (sulfonamide antibiotics)    Review of Systems   Constitutional: Negative for chills, diaphoresis and fever. HENT: Negative for rhinorrhea and sore throat. Eyes: Negative for discharge and redness. Respiratory: Positive for cough and shortness of breath. Cardiovascular: Positive for chest pain. Negative for palpitations. Gastrointestinal: Negative for abdominal pain, diarrhea, nausea and vomiting. Genitourinary:        Chronic angeles, which looks as though it hasnt been changed in a while   Musculoskeletal: Negative for arthralgias and back pain. Skin: Negative for rash. Neurological: Negative for dizziness and headaches. All other systems reviewed and are negative. Vitals:    02/03/17 1305 02/03/17 1335 02/03/17 1405 02/03/17 1450   BP: 155/66 153/82 176/81 170/85   Pulse: 60 60 63 65   Resp:  15 14 17   Temp:    98.1 °F (36.7 °C)   SpO2:  (!) 76% 97% 98%   Weight:       Height:                Physical Exam   Constitutional: She is oriented to person, place, and time. She appears well-developed and well-nourished. No distress. HENT:   Head: Normocephalic and atraumatic.    Eyes: Conjunctivae are normal. Pupils are equal, round, and reactive to light. Right eye exhibits no discharge. Left eye exhibits no discharge. No scleral icterus. Neck: Normal range of motion. Neck supple. Cardiovascular: Normal rate, regular rhythm and normal heart sounds. Exam reveals no gallop. No murmur heard. Pulmonary/Chest: Effort normal and breath sounds normal. No respiratory distress. She has no wheezes. She has no rales. She exhibits tenderness. Left chest palpation reproduces the pain she has been having     Abdominal: Soft. Bowel sounds are normal. There is no tenderness. There is no guarding. Musculoskeletal: Normal range of motion. She exhibits no edema. Neurological: She is alert and oriented to person, place, and time. She exhibits normal muscle tone. cni 2-12 grossly   Skin: Skin is warm and dry. She is not diaphoretic. Psychiatric: She has a normal mood and affect. Her behavior is normal.   Nursing note and vitals reviewed. MDM  Number of Diagnoses or Management Options  Decubitus ulcer of sacral area, stage IV [facility]):   Musculoskeletal chest pain:   Pleuritic chest pain:   Diagnosis management comments: Medical decision making note:  Atypical chest pain, ekg and enzymes are ok  This concludes the \"medical decision making note\" part of this emergency department visit note.       ED Course       Procedures

## 2017-02-17 PROBLEM — E11.8 DIABETES MELLITUS TYPE 2 WITH COMPLICATIONS (HCC): Chronic | Status: ACTIVE | Noted: 2017-01-01

## 2017-02-17 PROBLEM — J44.9 COPD (CHRONIC OBSTRUCTIVE PULMONARY DISEASE) (HCC): Chronic | Status: ACTIVE | Noted: 2017-01-01

## 2017-02-17 PROBLEM — Z79.01 CHRONIC ANTICOAGULATION: Chronic | Status: ACTIVE | Noted: 2017-01-01

## 2017-02-17 PROBLEM — L89.159 SACRAL DECUBITUS ULCER: Status: ACTIVE | Noted: 2017-01-01

## 2017-02-17 PROBLEM — I48.91 ATRIAL FIBRILLATION (HCC): Chronic | Status: ACTIVE | Noted: 2017-01-01

## 2017-02-17 NOTE — H&P
Subjective:     Patient: Fish Tavarez MRN: [id number]  SSN: [id number]    YOB: 1945  Age: 66 y.o. Sex: female        HPI: Ms. Joe Sharma is a 67 yo WF with PMH of HTN, DM2, pAF, SSS with pacer on xarelto living at SNF evaluated with painful and draining unstageable sacral decubitus ulcer. She has had this addressed with wound care and wound vac without improvement. She will need admit for surgical debridement and IV antibiotics.   Additionally has UTI    ROS positive for sinus congestion, dyspnea, edema and anorexia           Past Medical History   Diagnosis Date    Arthritis      generalized    Asthma      managed with inhalers    Atrial fibrillation ([address]      managed with medication and pacemaker    Chronic obstructive pulmonary disease ([address]      managed with inhalers    Diabetes mellitus, insulin dependent (IDDM), uncontrolled- A1c 11.5 12/29/2012    Embolism and thrombosis 12/5/2016    Hx of blood clots      x 2 right arm    Hypertension      managed with medication     Morbid obesity ([address]      BMI 49.7    Pacemaker      [place]    Peripheral neuropathy (HCC)      both feet and lower legs    Shortness of breath on exertion      managed with medication     Type 2 diabetes mellitus (HCC) Dx 1991     insulin dependent/ Avg / low BS symptoms @ 60/ last A1C 7    Urinary frequency     Urinary incontinence       Past Surgical History   Procedure Laterality Date    Pr removal of heel spur Bilateral     Hx hysterectomy      Vascular surgery procedure unlist Right 12/28/2012     removed from arm    Hx orthopaedic Bilateral      tennis elbow surgery    Hx amputation Right      Great toe and 2nd toe    Hx knee replacement Left     Hx refractive surgery Bilateral     Hx tonsil and adenoidectomy          (Not in a hospital admission)  Current Facility-Administered Medications   Medication Dose Route Frequency    sodium chloride (NS) flush 5-10 mL  5-10 mL IntraVENous Q8H  sodium chloride (NS) flush 5-10 mL  5-10 mL IntraVENous PRN    vancomycin (VANCOCIN) 1,000 mg in 0.9% sodium chloride (MBP/ADV) 250 mL  1,000 mg IntraVENous NOW    vancomycin (VANCOCIN) 1,000 mg in 0.9% sodium chloride (MBP/ADV) 250 mL  1,000 mg IntraVENous ONCE    budesonide (PULMICORT) 500 mcg/2 ml nebulizer suspension  500 mcg Nebulization BID RT    aztreonam (AZACTAM) 1 g in 0.9% sodium chloride (MBP/ADV) 100 mL  1 g IntraVENous Q8H    tuberculin injection 5 Units  5 Units IntraDERMal ONCE    Saccharomyces boulardii (FLORASTOR) capsule 500 mg  500 mg Oral BID    hydrALAZINE (APRESOLINE) 20 mg/mL injection 10 mg  10 mg IntraVENous Q6H PRN    0.9% sodium chloride infusion  75 mL/hr IntraVENous CONTINUOUS     Current Outpatient Prescriptions   Medication Sig    HYDROcodone-acetaminophen (NORCO) 5-325 mg per tablet Take 1 Tab by mouth every eight (8) hours as needed for Pain. Max Daily Amount: 3 Tabs.  naproxen sodium (ANAPROX DS) 550 mg tablet Take 1 Tab by mouth two (2) times daily (with meals).  fluticasone-vilanterol (BREO ELLIPTA) 100-25 mcg/dose inhaler Take 1 Puff by inhalation daily.  calcium-cholecalciferol, d3, (CALCIUM 600 + D) 600-125 mg-unit tab Take 1 Tab by mouth daily.  magnesium oxide (MAG-OX) 400 mg tablet Take 400 mg by mouth daily.  insulin aspart (NOVOLOG FLEXPEN) 100 unit/mL inpn 6 Units by SubCUTAneous route daily.  B INFANTIS/B ANI/B SARAH/B BIFID (PROBIOTIC 4X PO) Take 1 Tab by mouth daily.  multivitamin,tx-iron-ca-min (THERA-M) 27-0.4 mg tab Take 1 Tab by mouth daily.  amiodarone (PACERONE) 400 mg tablet Take 400 mg by mouth two (2) times a day.  doxycycline (MONODOX) 100 mg capsule Take 100 mg by mouth two (2) times a day.  ascorbic acid, vitamin C, (VITAMIN C) 500 mg tablet Take 500 mg by mouth two (2) times a day.  benzonatate (TESSALON PERLES) 100 mg capsule Take 100 mg by mouth two (2) times daily as needed for Cough.     rivaroxaban (XARELTO) 20 mg tab tablet Take 1 Tab by mouth nightly. Indications: PREVENT THROMBOEMBOLISM IN CHRONIC ATRIAL FIBRILLATION    aspirin delayed-release 81 mg tablet Take 81 mg by mouth daily.  HYDROcodone-acetaminophen (NORCO)  mg tablet Take 1 Tab by mouth three (3) times daily as needed for Pain. Max Daily Amount: 3 Tabs.  insulin glargine (LANTUS) 100 unit/mL injection 5 Units by SubCUTAneous route nightly.  budesonide-formoterol (SYMBICORT) 160-4.5 mcg/actuation HFA inhaler Take 2 Puffs by inhalation two (2) times a day.  docusate sodium (COLACE) 50 mg capsule Take 50 mg by mouth two (2) times a day.  losartan (COZAAR) 25 mg tablet Take 25 mg by mouth every morning.  cyanocobalamin (VITAMIN B-12) 2,500 mcg sublingual tablet Take 2,500 mcg by mouth every morning.  polyethylene glycol (MIRALAX) 17 gram packet Take 17 g by mouth two (2) times a day.  pravastatin (PRAVACHOL) 40 mg tablet Take 40 mg by mouth nightly.  furosemide (LASIX) 40 mg tablet Take 40 mg by mouth daily as needed. Allergies   Allergen Reactions    Penicillins Rash    Sulfa (Sulfonamide Antibiotics) Hives      Social History   Substance Use Topics    Smoking status: Former Smoker     Years: 2.00    Smokeless tobacco: Never Used      Comment: social smoking during teenage years   Velta Ganser Alcohol use No      Family History   Problem Relation Age of Onset    Heart Disease Mother     Diabetes Father         Review of Systems  Complete review of systems was obtained and is otherwise negative unless stated in the HPI       I have reviewed all the pertinent medical and surgical history, social history, allergies, family history,  as well as pertinent labs and studies .       Objective:     Patient Vitals for the past 24 hrs:   BP Temp Pulse Resp SpO2 Height Weight   02/17/17 1421 - - - - 96 % - -   02/17/17 1233 153/47 98.1 °F (36.7 °C) 60 16 94 % 5' 2\" (1.575 m) 122.9 kg (271 lb)   02/17/17 1231 - - - - 94 % - - Exam:  General: well developed, well nourished, no distress, alert, obese  Eyes: PERRLA  HEENT: oral cavity clear,nasal septum midline  Neck: supple, symmetrical, trachea midline, no adenopathy,no carotid bruit and no JVD  Lungs: clear to auscultation bilaterally, good effort   Heart: regular rate and rhythm, S1, S2 normal, no murmur, click, rub or gallop, 2+ edema   Abdomen: soft, non-tender. Bowel sounds normal. No masses,  no organomegaly, obese  Extremities: extremities normal, atraumatic  Skin: unstageable foul smelling and draining sacral decubitus ulcer   Neurologic: Alert and oriented X 3, normal strength and tone. Musculoskeletal: no gross deficits   Psychiatric: appropriate mood and affect    ECG:     Data Review (Labs):   Recent Results (from the past 24 hour(s))   C REACTIVE PROTEIN, QT    Collection Time: 02/17/17  2:02 PM   Result Value Ref Range    C-Reactive protein 4.7 (H) 0.0 - 0.9 mg/dL   METABOLIC PANEL, COMPREHENSIVE    Collection Time: 02/17/17  2:02 PM   Result Value Ref Range    Sodium 139 136 - 145 mmol/L    Potassium 4.5 3.5 - 5.1 mmol/L    Chloride 104 98 - 107 mmol/L    CO2 31 21 - 32 mmol/L    Anion gap 4 (L) 7 - 16 mmol/L    Glucose 217 (H) 65 - 100 mg/dL    BUN 52 (H) 8 - 23 MG/DL    Creatinine 1.38 (H) 0.6 - 1.0 MG/DL    GFR est AA 48 (L) >60 ml/min/1.73m2    GFR est non-AA 40 (L) >60 ml/min/1.73m2    Calcium 8.8 8.3 - 10.4 MG/DL    Bilirubin, total 0.4 0.2 - 1.1 MG/DL    ALT (SGPT) 45 12 - 65 U/L    AST (SGOT) 39 (H) 15 - 37 U/L    Alk.  phosphatase 133 50 - 136 U/L    Protein, total 7.2 6.3 - 8.2 g/dL    Albumin 2.5 (L) 3.2 - 4.6 g/dL    Globulin 4.7 (H) 2.3 - 3.5 g/dL    A-G Ratio 0.5 (L) 1.2 - 3.5     CBC WITH AUTOMATED DIFF    Collection Time: 02/17/17  2:02 PM   Result Value Ref Range    WBC 11.9 (H) 4.3 - 11.1 K/uL    RBC 3.07 (L) 4.05 - 5.25 M/uL    HGB 9.2 (L) 11.7 - 15.4 g/dL    HCT 29.5 (L) 35.8 - 46.3 %    MCV 96.1 79.6 - 97.8 FL    MCH 30.0 26.1 - 32.9 PG    MCHC 31.2 (L) 31.4 - 35.0 g/dL    RDW 16.8 (H) 11.9 - 14.6 %    PLATELET 439 261 - 790 K/uL    MPV 10.5 (L) 10.8 - 14.1 FL    DF AUTOMATED      NEUTROPHILS 77 43 - 78 %    LYMPHOCYTES 13 13 - 44 %    MONOCYTES 9 4.0 - 12.0 %    EOSINOPHILS 1 0.5 - 7.8 %    BASOPHILS 0 0.0 - 2.0 %    IMMATURE GRANULOCYTES 0.3 0.0 - 5.0 %    ABS. NEUTROPHILS 9.2 (H) 1.7 - 8.2 K/UL    ABS. LYMPHOCYTES 1.5 0.5 - 4.6 K/UL    ABS. MONOCYTES 1.0 0.1 - 1.3 K/UL    ABS. EOSINOPHILS 0.1 0.0 - 0.8 K/UL    ABS. BASOPHILS 0.0 0.0 - 0.2 K/UL    ABS. IMM.  GRANS. 0.0 0.0 - 0.5 K/UL   POC LACTIC ACID    Collection Time: 02/17/17  2:03 PM   Result Value Ref Range    Lactic Acid (POC) 1.6 0.5 - 1.9 mmol/L       Assessment / Plan:   Principal Problem:    Sacral decubitus ulcer (2/17/2017)    Active Problems:    UTI (urinary tract infection) (12/29/2012)      Pacemaker DDD [facility] (1/7/2013)      Hypertension (11/24/2016)      Atrial fibrillation ([facility] (2/17/2017)      Chronic anticoagulation (2/17/2017)      Diabetes mellitus type 2 with complications ([facility] (0/16/1797)      COPD (chronic obstructive pulmonary disease) ([facility] (2/17/2017)          -admit to medical bed   -NPO for surgery consult   -has PCN allergy, added azactam and continue vancomycin   -wound care consult   -home meds as ordered, holding xarelto for surgery consult   -CXR due to dyspnea, continue nebulizers  -recheck UA with c/s   -will need SW for dispo    DVT Prophylaxis:SCD  FEN:NPO,IVF  Code Status: DNR  PMD: [address] [facility] addressed:daughter charlotte [phone]  EST LOS 3-5 days  Alvino Nichols MD    Signed By: Alvino Nichols MD     February 17, 2017

## 2017-02-17 NOTE — PROGRESS NOTES
TRANSFER - IN REPORT:    Verbal report received from [address] on [address]  being received from ED for routine progression of care      Report consisted of patients Situation, Background, Assessment and   Recommendations(SBAR). Information from the following report(s) SBAR, Kardex, ED Summary, Procedure Summary, Intake/Output, MAR, Accordion, Recent Results and Med Rec Status was reviewed with the receiving nurse. Opportunity for questions and clarification was provided. Assessment completed upon patients arrival to unit and care assumed.

## 2017-02-17 NOTE — PROGRESS NOTES
Referral per RN as patient is very worried because she and her  just got put together in the same room at [facility] on [address] and she is afraid they are going to give her bed away. Call to Salma Duenas at [facility] who states he will put a bed hold on her bed and will do everything he can not to give it away before she comes back. States he will call me if there is a problem. Notified patient who is very relieved and appreciative.

## 2017-02-17 NOTE — ROUTINE PROCESS
TRANSFER - OUT REPORT:    Verbal report given to Stephany Bhardwaj  being transferred to 0 for routine progression of care       Report consisted of patients Situation, Background, Assessment and   Recommendations(SBAR). Information from the following report(s) ED Summary was reviewed with the receiving nurse. Lines:   Peripheral IV 02/17/17 Left (Active)        Opportunity for questions and clarification was provided.       Patient transported with:

## 2017-02-17 NOTE — PROGRESS NOTES
Skin assessment complete, unstageable foul smelling and draining sacral decubitus ulcer noted. Allevyen dressing applied patient tolerated well. Patient complained of pain 8/10.

## 2017-02-17 NOTE — ED TRIAGE NOTES
Pt has a bed sore on sacral area for about three months and has had a wound vac in place but nursing home staff states the wound was darker in color today and deeper than it was on Wed. Pt states it is painful.

## 2017-02-17 NOTE — ED PROVIDER NOTES
HPI Comments: 73-year-old female presents with painful draining decubitus ulcer. She states it was first noticed about a month ago, chart notes about 3 months ago. There has been a wound care nurse at the nursing home and she has had a wound VAC applied. She was sent to the emergency department today for discoloration and significant deepening of ulcer. Patient denies any fever or vomiting. No prior surgical debridements. Patient is a 66 y.o. female presenting with skin texture. The history is provided by the patient and a relative. Decubitus Ulcer   Pertinent negatives include no chest pain, no headaches and no shortness of breath.         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    Peripheral neuropathy (HCC)      both feet and lower legs    Shortness of breath on exertion      managed with medication     Type 2 diabetes mellitus (HCC) Dx 1991     insulin dependent/ Avg / low BS symptoms @ 58/ last A1C 7    Urinary frequency     Urinary incontinence        Past Surgical History:   Procedure Laterality Date    Pr removal of heel spur Bilateral     Hx hysterectomy      Vascular surgery procedure unlist Right 12/28/2012     removed from arm    Hx orthopaedic Bilateral      tennis elbow surgery    Hx amputation Right      Great toe and 2nd toe    Hx knee replacement Left     Hx refractive surgery Bilateral     Hx tonsil and adenoidectomy           Family History:   Problem Relation Age of Onset    Heart Disease Mother     Diabetes Father        Social History     Social History    Marital status:      Spouse name: N/A    Number of children: N/A    Years of education: N/A     Occupational History    Not on file. Social History Main Topics    Smoking status: Former Smoker     Years: 2.00    Smokeless tobacco: Never Used      Comment: social smoking during teenage years   [facility] Alcohol use No    Drug use: No    Sexual activity: Not on file     Other Topics Concern    Not on file     Social History Narrative    Has 2 cats    Was a Plastic     Mother, brother, uncle with TB and             ALLERGIES: Penicillins and Sulfa (sulfonamide antibiotics)    Review of Systems   Constitutional: Negative for fever. HENT: Negative for facial swelling. Eyes: Negative for visual disturbance. Respiratory: Negative for cough and shortness of breath. Cardiovascular: Negative for chest pain. Gastrointestinal: Negative for abdominal distention and vomiting. Genitourinary: Negative for dysuria. Musculoskeletal: Positive for back pain. Skin: Positive for color change and wound. Neurological: Negative for headaches. Psychiatric/Behavioral: Negative for confusion. Vitals:    02/17/17 1233   BP: 153/47   Pulse: 60   Resp: 16   Temp: 98.1 °F (36.7 °C)   SpO2: 94%   Weight: 122.9 kg (271 lb)   Height: 5' 2\" (1.575 m)            Physical Exam   Constitutional: She appears well-developed and well-nourished. Morbid obesity   HENT:   Head: Normocephalic and atraumatic. Eyes: Conjunctivae are normal. Pupils are equal, round, and reactive to light. Neck: Neck supple. Cardiovascular: Regular rhythm and normal heart sounds. Pulmonary/Chest: Effort normal. No respiratory distress. Abdominal: Soft. There is no tenderness. Musculoskeletal: She exhibits edema. She exhibits no tenderness. Neurological: She is alert. Skin:        Psychiatric: She has a normal mood and affect. Nursing note and vitals reviewed. MDM  Number of Diagnoses or Management Options  Diagnosis management comments: Parts of this document were created using dragon voice recognition software. The chart has been reviewed but errors may still be present. Infected decubitus ulcer. We'll check labs, wound culture. Started on IV antibiotics. We'll discuss with hospitalist for admission and possible debridement.        Amount and/or Complexity of Data Reviewed  Clinical lab tests: ordered and reviewed (Results for orders placed or performed during the hospital encounter of 02/17/17  -CBC WITH AUTOMATED DIFF       Result                                            Value                         Ref Range                       WBC                                               11.9 (H)                      4.3 - 11.1 K/uL                 RBC                                               3.07 (L)                      4.05 - 5.25 M/uL                HGB                                               9.2 (L)                       11.7 - 15.4 g/dL                HCT                                               29.5 (L)                      35.8 - 46.3 %                   MCV                                               96.1                          79.6 - 97.8 FL                  MCH                                               30.0                          26.1 - 32.9 PG                  MCHC                                              31.2 (L)                      31.4 - 35.0 g/dL                RDW                                               16.8 (H)                      11.9 - 14.6 %                   PLATELET                                          350                           150 - 450 K/uL                  MPV                                               10.5 (L)                      10.8 - 14.1 FL                  DF                                                AUTOMATED NEUTROPHILS                                       77                            43 - 78 %                       LYMPHOCYTES                                       13                            13 - 44 %                       MONOCYTES                                         9                             4.0 - 12.0 %                    EOSINOPHILS                                       1                             0.5 - 7.8 %                     BASOPHILS                                         0                             0.0 - 2.0 %                     IMMATURE GRANULOCYTES                             0.3                           0.0 - 5.0 %                     ABS. NEUTROPHILS                                  9.2 (H)                       1.7 - 8.2 K/UL                  ABS. LYMPHOCYTES                                  1.5                           0.5 - 4.6 K/UL                  ABS. MONOCYTES                                    1.0                           0.1 - 1.3 K/UL                  ABS. EOSINOPHILS                                  0.1                           0.0 - 0.8 K/UL                  ABS. BASOPHILS                                    0.0                           0.0 - 0.2 K/UL                  ABS. IMM.  GRANS.                                  0.0                           0.0 - 0.5 K/UL             -POC LACTIC ACID       Result                                            Value                         Ref Range                       Lactic Acid (POC)                                 1.6                           0.5 - 1.9 mmol/L           )  Tests in the medicine section of CPT®: ordered and reviewed      ED Course       Procedures

## 2017-02-18 NOTE — PROGRESS NOTES
Shift assessment complete. Pt alert and oriented x4, respirations diminished, even and unlabored, HOB elevated, pt denies any SOB at this time, S1&S2 auscultated, HR regular, abd soft, non- tender, Active BS in all 4 quadrants, pressure ulcers to sacrum, allevyn in place,and right and left heels.  Rivera in place draining yellow urine, IVF infusing without difficulty, pt denies any pain at this time, pt instructed to call for assistance, pt verbalizes understanding, bed low and locked, side rails x3, call light within reach.

## 2017-02-18 NOTE — PROGRESS NOTES
Mirapex dressing applied to wounds on bilateral heels. Patient's heels floated on pillows, patient refused to be turned on side off of wound.

## 2017-02-18 NOTE — PROGRESS NOTES
Kathryn Palomino M.D. Colon and Rectal Surgeon  [facility]  [address], [address] [facility], [address]   Phone: [phone] Fax: Damien Diaz  MRN: [id number]    HISTORY OF PRESENT ILLNESS:   Katiuska Drake is a 66y.o. year old female who I was asked to see regarding a sacral decubitus. The patient stated she has known about a sacral decubitus since she was at [facility] in November 2016. She never saw a wound MD there and was never sent to a wound center for this wound. When [employer] stopped paying,\" she was sent to [facility]. At that facility, she had been seeing a wound physician intermittently. She states it was being treated with a VAC, but does not know the details of why that was removed. She stated that she has been told day after day that \"the wound looks good, healing well\" up until 2/17/17, when she was told it \"looks really bad\" and she has to go to the hospital.      No fevers. More nausea/vomiting last night. She describes lots of pain associated with the decub. REVIEW OF SYSTEMS:   Constitutional: No fevers/chills, no weakness, no fatigue, no lightheadedness, no night sweats, no insomnia, no appetite changes, no weight changes, no memory problems. Respiratory: No cough, +dyspnea, no wheezing, no hemoptysis, no sleep apnea   Cardiovascular: No chest pains, no chest pressure, no chest tightness, no palpitations   Gastrointestinal: No abdominal pains, no bowel habit changes, no nausea, no emesis, no hematochezia, no melena, no cramping, no constipation, no diarrhea, no incontinence, no jaundice, no diverticulosis. Otherwise per HPI     PHYSICAL EXAM:  Blood pressure 124/54, pulse 60, temperature 95.6 °F (35.3 °C), resp. rate 18, height 5' 2\" (1.575 m), weight 271 lb (122.9 kg), SpO2 90 %. Body mass index is 49.57 kg/(m^2).   The patient was evaluated in the presence of a medical assistant  General: Normotensive, in no acute distress, well developed, well nourished appearing   Chest:  Lungs clear, no rales, no rhonchi, no wheezes   Heart:  irreg, no murmurs, no rubs, no gallops   Abdomen:  Soft, obese, no tenderness, no rebound, no guarding, no masses, nondistended. Well healed lower midline, no hernia   Skin:  Unremarkable upper extremities. Left heel has a small eschar, no erythema, no drainage  Right heel has an unstageable ulcer with a leathery black eschar. No tenderness, no erythema, no drainge. Her sacral area was evaluated in the LLD position. At the skin level, there are skin changes with bluish/purple discoloration around the upper aspect of the wound. The tissue is not necrotic, and bleeds and has sensation. Inferiorly, there is a 2x2 cm open area of partial thickness skin breakdown. It is about 1 cm deep and there is a 1 cm wide skin bridge just above it. Above the skin bridge is a slightly larger open wound that is 2.5 x 2.5 cm in size. This one is much deeper, and is probably 3 cm deep. There is no exposed bone in the base of the wound. There is minimal superior and right lateral undermining. There is significant undermining in the left lateral/left upper direction of at least 6-8 cm. The base of the wound has necrotic, soupy slough. Recent Labs      02/18/17   0516  02/17/17   1402   WBC  10.0  11.9*   HGB  8.7*  9.2*   HCT  28.1*  29.5*   PLT  313  350       Recent Labs      02/18/17   0516  02/17/17   1402   NA  143  139   K  4.5  4.5   CL  106  104   CO2  29  31   BUN  52*  52*   CREA  1.46*  1.38*   TBILI   --   0.4   SGOT   --   39*   ALT   --   45   AP   --   133       UA positive 2/14/17 with large LE and 4+ bacteria  UA still positive 2/17/17 with large LE and 2+ bacteria, 20-50 WBC  A1c 7.9  CRP 4.7  Lactate 1.6  Blood cultures 2/17/17--pending  Wound culture 2/17/17--light GNRs  Labs reviewed by me.     ASSESSMENT:  Lalita sacral decubitus, stage III   S/p local debridement at bedside 2/17/17  Bilateral heel wounds    RECOMMENDATIONS:  --aggressive offloading of sacrum. Side to side rolls, avoid laying on her back  --tight glucose control  --on vanc, cleocin, and aztreonam.  antibiotics per primary. I think the leukocytosis is more likely from her UTI than this chronic wound  --continue dry gauze packing, as I am hoping that removing the gauze will hep continue the mechanical debridement  --ok to have diet for now, NPO on Lewis morning  --hold xarelto for now  --reevaluate wound and consider operative debridement Sunday, after Xarelto has worn off  --wound care consult for their input  --offload heals, follow these areas conservatively  --cultures have already been done and are pending.  --will follow      Dread Riddle MD  2/18/2017      Elements of this note have been created with speech-recognition software. As a result, errors in speech recognition may have occurred.

## 2017-02-18 NOTE — PROGRESS NOTES
Patient's daughter removed patient's dressing and packing from sacral wound, called to the nursing station for a new dressing. Instructed daughter to let the nursing staff manage the dressing changes and not to remove dressings. She voiced understanding. Redressed wound per MD order patient tolerated well.

## 2017-02-18 NOTE — PROGRESS NOTES
Pharmacokinetic Consult to Pharmacist    Harlanaurora Ashley is a 66 y.o. female being treated for SSTI/decubitus ulcer with Azactam and Vancomycin. @CHRISTEN(81)@  @[facility](58)@  Lab Results   Component Value Date/Time    BUN 52 02/18/2017 05:16 AM    Creatinine 1.46 02/18/2017 05:16 AM    WBC 10.0 02/18/2017 05:16 AM    Procalcitonin 1.0 11/25/2016 11:05 AM      Estimated Creatinine Clearance: 42.9 mL/min (based on Cr of 1.46). CULTURES:  Blood - NG x 20 hours  Wound - GNR and GPC    Day 2 of vancomycin. Goal trough is 12 - 18. Vancomycin dose initiated at 2 gm IV x 1 dose. Vancomycin dose continued at 1750 mg IV every 24 hours. Will continue to follow patient.       Thank you,    Dot Vasquez, PharmD

## 2017-02-18 NOTE — PROGRESS NOTES
Hospitalist Progress Note    2017  2:36 PM  Admit Date: 2017 12:30 PM   NAME: Charles Damon   :     MRN:  [id number]   Attending: Demetria Lennox, MD  PCP:  Karthikeyan Chavez MD  Treatment Team: Attending Provider: Demetria Lennox, MD; Consulting Provider: Helen Jimenez MD  SUBJECTIVE:       Ms. Jin Malik is a 65 yo WF with PMH of HTN, DM2, pAF, SSS with pacer on xarelto living at [facility] evaluated with painful and draining unstageable sacral decubitus ulcer. She has had this addressed with wound care and wound vac without improvement. She was need admitted for surgical debridement, wound care and IV antibiotics. She additionally has UTI and JORGE, cx pending. Day 2 vancomycin and azactam, BC pending.  Evaluated by surgery with plans for possible debridement     -18 still with sacral pain, has anorexia, has nausea, no abd pain, denies cough and dyspnea        Recent Results (from the past 24 hour(s))   URINALYSIS W/ RFLX MICROSCOPIC    Collection Time: 17  4:40 PM   Result Value Ref Range    Color YELLOW      Appearance CLOUDY      Specific gravity 1.010 1.001 - 1.023      pH (UA) 7.0 5.0 - 9.0      Protein NEGATIVE  NEG mg/dL    Glucose NEGATIVE  mg/dL    Ketone NEGATIVE  NEG mg/dL    Bilirubin NEGATIVE  NEG      Blood NEGATIVE  NEG      Urobilinogen 0.2 0.2 - 1.0 EU/dL    Nitrites NEGATIVE  NEG      Leukocyte Esterase LARGE (A) NEG      WBC 20-50 0 /hpf    RBC 0-3 0 /hpf    Epithelial cells 0 0 /hpf    Bacteria 2+ (H) 0 /hpf    Casts 0-3 0 /lpf   MRSA SCREEN - PCR (NASAL)    Collection Time: 17  4:40 PM   Result Value Ref Range    Special Requests: NO SPECIAL REQUESTS      Culture result:        MRSA target DNA is not detected (presumptive not colonized with MRSA)   GLUCOSE, POC    Collection Time: 17  5:40 PM   Result Value Ref Range    Glucose (POC) 270 (H) 65 - 100 mg/dL   GLUCOSE, POC    Collection Time: 17 11:22 PM   Result Value Ref Range Glucose (POC) 158 (H) 65 - 987 mg/dL   METABOLIC PANEL, BASIC    Collection Time: 02/18/17  5:16 AM   Result Value Ref Range    Sodium 143 136 - 145 mmol/L    Potassium 4.5 3.5 - 5.1 mmol/L    Chloride 106 98 - 107 mmol/L    CO2 29 21 - 32 mmol/L    Anion gap 8 7 - 16 mmol/L    Glucose 176 (H) 65 - 100 mg/dL    BUN 52 (H) 8 - 23 MG/DL    Creatinine 1.46 (H) 0.6 - 1.0 MG/DL    GFR est AA 45 (L) >60 ml/min/1.73m2    GFR est non-AA 37 (L) >60 ml/min/1.73m2    Calcium 8.6 8.3 - 10.4 MG/DL   CBC WITH AUTOMATED DIFF    Collection Time: 02/18/17  5:16 AM   Result Value Ref Range    WBC 10.0 4.3 - 11.1 K/uL    RBC 2.90 (L) 4.05 - 5.25 M/uL    HGB 8.7 (L) 11.7 - 15.4 g/dL    HCT 28.1 (L) 35.8 - 46.3 %    MCV 96.9 79.6 - 97.8 FL    MCH 30.0 26.1 - 32.9 PG    MCHC 31.0 (L) 31.4 - 35.0 g/dL    RDW 16.8 (H) 11.9 - 14.6 %    PLATELET 626 295 - 309 K/uL    MPV 10.3 (L) 10.8 - 14.1 FL    DF AUTOMATED      NEUTROPHILS 81 (H) 43 - 78 %    LYMPHOCYTES 11 (L) 13 - 44 %    MONOCYTES 7 4.0 - 12.0 %    EOSINOPHILS 0 (L) 0.5 - 7.8 %    BASOPHILS 0 0.0 - 2.0 %    IMMATURE GRANULOCYTES 0.6 0.0 - 5.0 %    ABS. NEUTROPHILS 8.2 1.7 - 8.2 K/UL    ABS. LYMPHOCYTES 1.1 0.5 - 4.6 K/UL    ABS. MONOCYTES 0.7 0.1 - 1.3 K/UL    ABS. EOSINOPHILS 0.0 0.0 - 0.8 K/UL    ABS. BASOPHILS 0.0 0.0 - 0.2 K/UL    ABS. IMM.  GRANS. 0.1 0.0 - 0.5 K/UL   GLUCOSE, POC    Collection Time: 02/18/17  6:12 AM   Result Value Ref Range    Glucose (POC) 176 (H) 65 - 100 mg/dL   GLUCOSE, POC    Collection Time: 02/18/17 11:52 AM   Result Value Ref Range    Glucose (POC) 205 (H) 65 - 100 mg/dL       Allergies   Allergen Reactions    Penicillins Rash    Sulfa (Sulfonamide Antibiotics) Hives     Current Facility-Administered Medications   Medication Dose Route Frequency Provider Last Rate Last Dose    sodium chloride (NS) flush 5-10 mL  5-10 mL IntraVENous Q8H Laura Huerta MD   10 mL at 02/18/17 0602    sodium chloride (NS) flush 5-10 mL  5-10 mL IntraVENous PRN Bre M Sushant Quintero MD        amiodarone (CORDARONE) tablet 200 mg  200 mg Oral BID Ana Kelly MD   200 mg at 02/18/17 0803    ascorbic acid (vitamin C) (VITAMIN C) tablet 500 mg  500 mg Oral BID Ana Kelly MD   500 mg at 02/18/17 0803    aspirin delayed-release tablet 81 mg  81 mg Oral DAILY Ana Kelly MD   81 mg at 02/18/17 0803    docusate sodium (COLACE) capsule 100 mg  100 mg Oral BID Ana Kelly MD   100 mg at 02/18/17 0803    insulin glargine (LANTUS) injection 5 Units  5 Units SubCUTAneous QHS Ana Kelly MD   5 Units at 02/17/17 2322    losartan (COZAAR) tablet 25 mg  25 mg Oral DAILY Ana Kelly MD   25 mg at 02/18/17 0803    polyethylene glycol (MIRALAX) packet 17 g  17 g Oral BID Ana Kelly MD   17 g at 02/18/17 0803    pravastatin (PRAVACHOL) tablet 40 mg  40 mg Oral QHS Ana Kelly MD   40 mg at 02/17/17 2130    sodium chloride (NS) flush 5-10 mL  5-10 mL IntraVENous Q8H Ana Kelly MD   10 mL at 02/18/17 0602    sodium chloride (NS) flush 5-10 mL  5-10 mL IntraVENous PRN Ana Kelly MD        acetaminophen (TYLENOL) tablet 650 mg  650 mg Oral Q6H PRN Ana Kelly MD        HYDROcodone-acetaminophen (NORCO)  mg tablet 1 Tab  1 Tab Oral Q4H PRN Ana Kelly MD        HYDROmorphone (PF) (DILAUDID) injection 1 mg  1 mg IntraVENous Q4H PRN Ana Kelly MD   1 mg at 02/18/17 1231    naloxone (NARCAN) injection 0.4 mg  0.4 mg IntraVENous PRN Ana Kelly MD        ondansetron [facility]) injection 4 mg  4 mg IntraVENous Q4H PRN Ana Kelly MD   4 mg at 02/18/17 0146    aztreonam (AZACTAM) 1 g in 0.9% sodium chloride (MBP/ADV) 100 mL  1 g IntraVENous Emerson Vigil  mL/hr at 02/18/17 0751 1 g at 02/18/17 0751    Saccharomyces boulardii (FLORASTOR) capsule 500 mg  500 mg Oral BID Ana Kelly MD   500 mg at 02/18/17 9288    hydrALAZINE (APRESOLINE) 20 mg/mL injection 10 mg  10 mg IntraVENous Q6H PRN Emmy Johnson MD        0.9% sodium chloride infusion  125 mL/hr IntraVENous CONTINUOUS Emmy Johnson  mL/hr at 17 1107 125 mL/hr at 17 1107    insulin lispro (HUMALOG) injection   SubCUTAneous Q6H Emmy Johnson MD   4 Units at 17 1224    vancomycin (VANCOCIN) 1750 mg in  ml infusion  1,750 mg IntraVENous Q24H Emmy Johnson MD        budesonide (PULMICORT) 500 mcg/2 ml nebulizer suspension  500 mcg Nebulization BID RT Emmy Johnson MD   500 mcg at 17 4361    And    albuterol CONCENTRATE 2.5mg/0.5 mL neb soln  2.5 mg Nebulization Q6H RT Emmy Johnson MD   2.5 mg at 17 1404           Review of Systems as noted above in HPI   PHYSICAL EXAM     Visit Vitals    /72 (BP 1 Location: Left arm, BP Patient Position: At rest)    Pulse 61    Temp 96.8 °F (36 °C)    Resp 20    Ht 5' 2\" (1.575 m)    Wt 122.9 kg (271 lb)    SpO2 99%    BMI 49.57 kg/m2      Temp (24hrs), Av.5 °F (35.8 °C), Min:95.6 °F (35.3 °C), Max:97.3 °F (36.3 °C)    Oxygen Therapy  O2 Sat (%): 99 % (17 1130)  Pulse via Oximetry: 66 beats per minute (17 0844)  O2 Device: Nasal cannula (17 1406)  O2 Flow Rate (L/min): 3 l/min (17 1406)    Intake/Output Summary (Last 24 hours) at 17 1436  Last data filed at 17 0750   Gross per 24 hour   Intake              973 ml   Output             1000 ml   Net              -27 ml      General: No acute distress,conversive  Lungs:  CTAB, good effort   Heart:  Regular rate and rhythm, no murmur, no edema   Abdomen: Soft, nontender and nondistended, normal bowel sounds  Extremities: Warm and dry         DIAGNOSTIC STUDIES      Labs and Studies from previous 24 hours have been personally reviewed by myself           DNR    ASSESSMENT / Dayday Marshall 169 Problems    Diagnosis Date Noted    Sacral decubitus ulcer 02/17/2017    Atrial fibrillation ([facility] [address] 02/17/2017    Chronic anticoagulation 02/17/2017    Diabetes mellitus type 2 with complications ([facility] 16/99/5374    COPD (chronic obstructive pulmonary disease) ([facility] 02/17/2017    Hypertension 11/24/2016    Pacemaker DDD [facility] 01/07/2013    UTI (urinary tract infection) 12/29/2012         -continue azactam and  Vancomycin, follow micro   -wound care consulted   -appreciate surgery recommendations   -home meds as ordered, holding xarelto for possible debridement    -hydrate and follow creatinine   -will need SW for dispo   DVT Prophylaxis:SCD  FEN:oral,IVF  Code Status: DNR  [facility] addressed:abdelrhaman porter [phone]  Nory Cho MD        Signed By: Nory Cho MD     February 18, 2017

## 2017-02-18 NOTE — CONSULTS
Valdemar Corona M.D. Colon and Rectal Surgeon  [facility]  [address], [address] [facility], [facility] [address]  Phone: [phone] Fax: [phone]      Consult Note  2/17/2017    Meredith Omer  MRN: [id number]    Requesting Provider: Xochitl Ocampo    Primary Care Physician: Eliot De Anda MD    Reason for Consult: decubitus    HISTORY OF PRESENT ILLNESS:   Meredith Omer is a 66y.o. year old female who I was asked to see regarding a sacral decubitus. The patient states she has known about a sacral decubitus since she was at [facility] in November 2016. She states she never saw a wound MD there and was never sent to a wound center for this wound. When [employer] stopped paying,\" she was sent to [place]. At that facility, she has been seeing a wound physician intermittently. She states it was being treated with a VAC, but does not know the details of why that was removed. She states that she has been told day after day that \"the wound looks good, healing well\" up until today, when she was told it \"looks really bad\" and she has to go to the hospital.  She has had no fevers. She had nausea/vomiting tonight due to some medication she received. She describes lots of pain associated with the decub. She has poorly controlled DM. She has previously seen Dr Cory Almendarez in the wound center for heel wounds, and reviewing his notes, she had an exaggerated pain response then too. REVIEW OF SYSTEMS:   Constitutional: No fevers/chills, no weakness, no fatigue, no lightheadedness, no night sweats, no insomnia, no appetite changes, no weight changes, no memory problems. Eyes: No changes in vision, no diplopia, no tearing, no scotomata, no pain   Ears/Nose/Throat/Mouth:  No change in hearing, no tinnitus, no bleeding, no epistaxis, no nasal discharge, no sinusitis.    Respiratory: No cough, +dyspnea, no wheezing, no hemoptysis, no sleep apnea   Cardiovascular: No chest pains, no chest pressure, no chest tightness, no palpitations   Gastrointestinal: No abdominal pains, no bowel habit changes, no nausea, no emesis, no hematochezia, no melena, no cramping, no constipation, no diarrhea, no incontinence, no jaundice, no diverticulosis. Otherwise per HPI   Genitourinary: No dysuria, no hematuria, no urinary frequency, no nocturia, no recent UTIs   Musculoskeletal: No back/neck pain, no arthritis, no joint pain/swelling, no muscle pains   Skin: No rashes, no itching, +ulcers   Hematologic:  No easy bruising, no anemia   Neurologic: No headaches, no dizziness, no seizures   Psychiatric: No depressive symptoms, no anxiety symptoms, no changes in mood, no substance abuse     PAST MEDICAL HISTORY:  OA, asthma, afib, COD, DM2, h/o DVT x2, HTN, neuropathy, kidney stones    PAST SURGICAL HISTORY:  Bilateral heel spurs, hysterectomy, bilateral tennis elbow, right great and second toe amputation, left knee replacement, ORIF right femur, tonsillectomy/adenoids, bilateral Lasik    ALLERGIES: Sulfa, PCN    HOME MEDICATIONS:     Prior to Admission Medications   Prescriptions Last Dose Informant Patient Reported? Taking? B INFANTIS/B ANI/B SARAH/B BIFID (PROBIOTIC 4X PO)   Yes No   Sig: Take 1 Tab by mouth daily. HYDROcodone-acetaminophen (NORCO)  mg tablet   No No   Sig: Take 1 Tab by mouth three (3) times daily as needed for Pain. Max Daily Amount: 3 Tabs. HYDROcodone-acetaminophen (NORCO) 5-325 mg per tablet   No No   Sig: Take 1 Tab by mouth every eight (8) hours as needed for Pain. Max Daily Amount: 3 Tabs. amiodarone (PACERONE) 400 mg tablet   Yes No   Sig: Take 400 mg by mouth two (2) times a day. ascorbic acid, vitamin C, (VITAMIN C) 500 mg tablet   Yes No   Sig: Take 500 mg by mouth two (2) times a day. aspirin delayed-release 81 mg tablet   Yes No   Sig: Take 81 mg by mouth daily.    benzonatate (TESSALON PERLES) 100 mg capsule   Yes No   Sig: Take 100 mg by mouth two (2) times daily as needed for Cough. budesonide-formoterol (SYMBICORT) 160-4.5 mcg/actuation HFA inhaler   Yes No   Sig: Take 2 Puffs by inhalation two (2) times a day. calcium-cholecalciferol, d3, (CALCIUM 600 + D) 600-125 mg-unit tab   Yes No   Sig: Take 1 Tab by mouth daily. cyanocobalamin (VITAMIN B-12) 2,500 mcg sublingual tablet   Yes No   Sig: Take 2,500 mcg by mouth every morning. docusate sodium (COLACE) 50 mg capsule   Yes No   Sig: Take 50 mg by mouth two (2) times a day. doxycycline (MONODOX) 100 mg capsule   Yes No   Sig: Take 100 mg by mouth two (2) times a day. fluticasone-vilanterol (BREO ELLIPTA) 100-25 mcg/dose inhaler   Yes No   Sig: Take 1 Puff by inhalation daily. furosemide (LASIX) 40 mg tablet   Yes No   Sig: Take 40 mg by mouth daily as needed. insulin aspart (NOVOLOG FLEXPEN) 100 unit/mL inpn   Yes No   Si Units by SubCUTAneous route daily. insulin glargine (LANTUS) 100 unit/mL injection   No No   Si Units by SubCUTAneous route nightly. losartan (COZAAR) 25 mg tablet   Yes No   Sig: Take 25 mg by mouth every morning.   magnesium oxide (MAG-OX) 400 mg tablet   Yes No   Sig: Take 400 mg by mouth daily. multivitamin,tx-iron-ca-min (THERA-M) 27-0.4 mg tab   Yes No   Sig: Take 1 Tab by mouth daily. naproxen sodium (ANAPROX DS) 550 mg tablet   No No   Sig: Take 1 Tab by mouth two (2) times daily (with meals). polyethylene glycol (MIRALAX) 17 gram packet   Yes No   Sig: Take 17 g by mouth two (2) times a day. pravastatin (PRAVACHOL) 40 mg tablet   Yes No   Sig: Take 40 mg by mouth nightly. rivaroxaban (XARELTO) 20 mg tab tablet   No No   Sig: Take 1 Tab by mouth nightly. Indications: PREVENT THROMBOEMBOLISM IN CHRONIC ATRIAL FIBRILLATION      Facility-Administered Medications: None       SOCIAL HISTORY:  to her  of 40 years. She lives in [place]. She has five children, 14 grandchildren, and 7 great grandchildren.   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 Nonsmoker. No alcohol    PREVENTION: Never had a colonoscopy     FAMILY HISTORY: No colon or rectal cancer. No history of polyps. No breast, prostate, ovary, endometrial, gastric, or pancreatic cancers. PHYSICAL EXAM:  Blood pressure 136/58, pulse 60, temperature 97.1 °F (36.2 °C), resp. rate 14, height 5' 2\" (1.575 m), weight 271 lb (122.9 kg), SpO2 99 %. Body mass index is 49.57 kg/(m^2). The patient was evaluated in the presence of a medical assistant  General: Normotensive, in no acute distress, well developed, well nourished appearing   Head:  AT/NC, no lesions   Eyes:  PERRLA, EOM's full, conjunctivae clear   Neck:  Supple, no masses, no lymphadenopathy, no thyromegaly, no carotid bruits   Chest:  Lungs clear, no rales, no rhonchi, no wheezes   Heart:  irreg, no murmurs, no rubs, no gallops   Abdomen:  Soft, obese, no tenderness, no rebound, no guarding, no masses, nondistended. Well healed lower midline, no hernia   Rectal:  Deferred   Back:  Normal curvature, no tenderness. Negative Roca's punch. Extremities:  FROM, no deformities, no edema, no erythema   Neuro:  Physiologic, oriented x3, full affect, no localizing findings   Skin:  Unremarkable upper extremities. Left heel has a small eschar, no erythema, no drainage  Right heel has an unstageable ulcer with a leathery black eschar. No tenderness, no erythema, no drainge. Her sacral area was evaluated in the LLD position. At the skin level, there are skin changes with bluish/purple discoloration around the upper aspect of the wound. The tissue is not necrotic, and bleeds and has sensation. Inferiorly, there is a 2x2 cm open area of partial thickness skin breakdown. It is about 1 cm deep and there is a 1 cm wide skin bridge just above it. Above the skin bridge is a slightly larger open wound that is 2.5 x 2.5 cm in size. This one is much deeper, and is probably 3 cm deep.   There is no exposed bone in the base of the wound. There is minimal superior and right lateral undermining. There is significant undermining in the left lateral/left upper direction of at least 6-8 cm. The base of the wound has necrotic, soupy slough. Verbal consent was obtained. This subcutaneous tissue was sharply excisionally debrided with scissors. The tissue was dead and no anesthesia was given. Despite the tissue being necrotic and liquifying, the patient reported feeling pain associated with the procedure. All visible necrotic tissue was removed, but exam was limited by poor lighting in the room. Recent Labs      02/17/17   1402   WBC  11.9*   HGB  9.2*   HCT  29.5*   PLT  350       Recent Labs      02/17/17   1402   NA  139   K  4.5   CL  104   CO2  31   BUN  52*   CREA  1.38*   TBILI  0.4   SGOT  39*   ALT  45   AP  133       UA positive 2/14/17 with large LE and 4+ bacteria  UA still positive 2/17/17 with large LE and 2+ bacteria, 20-50 WBC  A1c 7.9  CRP 4.7  Lactate 1.6  Blood cultures 2/17/17--pending  Wound culture 2/17/17--pending  Labs reviewed by me. ASSESSMENT:  Lalita sacral decubitus, stage III  Bilateral heel wounds    RECOMMENDATIONS:  --bedside debridement tonight  --aggressive offloading. Side to side rolls, avoid laying on her back  --tight glucose control  --on vanc, cleocin, and aztreonam.  antibiotics per primary.   I think the leukocytosis is more likely from her UTI than this chronic wound  --Will start with dry gauze packing, as I am hoping that removing the gauze will hep continue the mechanical debridement  --ok to have diet for now, NPO on Sunday morning  --hold xarelto for now  --reevaluate wound and consider operative debridement Sunday, after Xarelto has worn off  --wound care consult for their input  --offload heals, follow these areas conservatively  --cultures have already been done and are pending.  --will follow      Julia Hernandez MD  2/17/2017      Elements of this note have been created with speech-recognition software. As a result, errors in speech recognition may have occurred.

## 2017-02-19 NOTE — PROGRESS NOTES
Hospitalist Progress Note    2017  Admit Date: 2017 12:30 PM   NAME: Kirill Rand   :  1945   MRN:  [id number]   Attending: Tommy Parrish MD  PCP:  Nida Mobley MD    SUBJECTIVE:   Patient is a 65 yo CF with a history of HTN, DM2, pAF, SSS with pacer on xarelto living at [facility] who presented to the ED with a complaint of painful and draining sacral decubitus ulcer. Has been seeing wound care at [place] and told ulcer was doing better after wound vac which was removed until . Admitted for surgical debridement and also found with UTI, JORGE. 2017: No Events over night. No new complaints. Continues to have sacral pain. Review of Systems negative with exception of pertinent positives noted above  PHYSICAL EXAM     Visit Vitals    /48 (BP 1 Location: Left arm, BP Patient Position: At rest)    Pulse 60    Temp 95.9 °F (35.5 °C)    Resp 18    Ht 5' 2\" (1.575 m)    Wt 122.9 kg (271 lb)    SpO2 98%    BMI 49.57 kg/m2      Temp (24hrs), Av.8 °F (36 °C), Min:95.7 °F (35.4 °C), Max:98.4 °F (36.9 °C)    Oxygen Therapy  O2 Sat (%): 98 % (17 1113)  Pulse via Oximetry: 61 beats per minute (17 2054)  O2 Device: Nasal cannula (17 1037)  O2 Flow Rate (L/min): 3 l/min (17 1037)    Intake/Output Summary (Last 24 hours) at 17 1243  Last data filed at 17 0936   Gross per 24 hour   Intake             2800 ml   Output             1605 ml   Net             1195 ml      General: Alert and oriented, No acute distress. Fatigue nontoxic appearing. Obese  Eye: EOMI, Normal conjunctiva  HENT: Normocephalic, atraumatic, Normal hearing, dry mucous membranes. Respiratory: Lungs are clear to auscultation, Respirations are non-labored. Cardiovascular: irreg. Gastrointestinal: Soft, Non-tender, positive bowel sounds   Musculoskeletal: No cyanosis, clubbing. S/p right 1sta nd 2nd amputation. Integumentary: Warm, Heels dressing c/d/i. Neurologic: Alert, Oriented, No focal deficits. Psychiatric: Cooperative, flat affect. IMAGING  CXR 2/17: NAD    ASSESSMENT      Lalita sacral decubitus, stage III  - Surgery following and recommends offloading sacrum and heels, atb, hold xarelto, s/p debridement in or 2/19  - wound care consult   - azactam and Vancomycin    Acute urinary tract infection   - IV azactam and Vancomycin. - probiotics  - IVFs. - Follow Urine C&S.  - leukocytosis resolved. Acute kidney injury- resolving  - Hold nephrotoxic medications. - Follow BUN and Cr. Baseline Cr wnl.   - likely prerenal secondary to dehydration/infection     Acute on chronic anemia  - follow hgb  - does not require transfusion at this time    A fib  - holding anticoagulation    DM II uncontrolled, hyperglycemia. - ACs qAC & qHS. - ISS. Lantus.   - Adjust medications as necessary based on each 24 hour insulin requirements. - Diabetic Diet. - A1C 7.9    Hypertension  -  Continue home antihypertensives; amiodarone, cozaar.   -  Monitor and trend BP.   -  prn Hydralazine. COPD  - Pulmicort  - albuterol  - O2    HLD  - Continue Statin    DVT prophylaxis:scds  DNR  Discussed with Dr. Graham Reading: to SNF when stable. Total Time spent: 35 minutes. Counseling & coordinating care dominated the encounter >55%. Counseled patient regarding dx, rx, tx. Reviewed prior records, labs, vitals, diagnostic tests, flow sheet, home medications, inpatient medications, nursing and provider notes.     Meghna Colin PA-C, MPAS

## 2017-02-19 NOTE — BRIEF OP NOTE
BRIEF OPERATIVE NOTE    Date of Procedure: 2/19/2017   Preoperative Diagnosis: Sacral Decubitus ulcer  Postoperative Diagnosis: Sacral Decubitus ulcer    Procedure(s):   INCISION DRAINAGE AND DEBRIDEMENT OF SACRAL DECUBITUS ULCER INCLUDING SUBCUTANEOUS AND FACSIA  Surgeon(s) and Role:     * Gaurav Braga MD - Primary            Surgical Staff:  Circ-1: Lupis Bolaños RN  Scrub Tech-1: Franchesca Felder  Event Time In   Incision Start 0205   Incision Close 4050     Anesthesia: General   Estimated Blood Loss: 50  Specimens:   ID Type Source Tests Collected by Time Destination   1 : SACRAL DECUBITUS WOUND Wound Sacral Wound CULTURE, WOUND W GRAM STAIN Gaurav Braga MD 2/19/2017 9399 Microbiology   2 : SACRAL DECUBITUS TISSUE Tissue Sacral Wound CULTURE, ANAEROBIC Gaurav Braga MD 2/19/2017 1652 Microbiology   3 : SACRAL DECUBITUS TISSUE Tissue Sacral Wound CULTURE, TISSUE W GRAM STAIN Gaurav Braga MD 2/19/2017 0911 Microbiology      Findings: necrotic tissue. Wound measures 5 cm width x 4 cm axially x 3 cm deep.   There is >5 cm undermining in the left side, with minimal 1-2 cm undermining elsewhere   Complications: none  Implants: * No implants in log *

## 2017-02-19 NOTE — INTERVAL H&P NOTE
H&P Update:  Genie Byrne was seen and examined. History and physical has been reviewed. The patient has been examined.  There have been no significant clinical changes since the completion of the originally dated History and Physical.    Signed By: Sheeba Westbrook MD     February 19, 2017 8:32 AM

## 2017-02-19 NOTE — PROGRESS NOTES
TRANSFER - IN REPORT:    Verbal report received from [facility] (name) on Rehana Soni  being received from PACU (unit) for routine post - op      Report consisted of patients Situation, Background, Assessment and   Recommendations(SBAR). Information from the following report(s) Procedure Summary, Intake/Output and MAR was reviewed with the receiving nurse. Opportunity for questions and clarification was provided. Assessment completed upon patients arrival to unit and care assumed.

## 2017-02-19 NOTE — PROGRESS NOTES
Pt just allowed us to reposition her gently on left side with multiple pillows. Norco 10/325 mg po given for the pain.

## 2017-02-19 NOTE — PROGRESS NOTES
Received pt from PACU in stable condition. Pt is drowsy & states she is sleepy & is having horrible pain right now. VS stable. IV is no longer patent. Discussed with Dr. Macario Tejeda & put in order for PICC line. Will continue to monitor.

## 2017-02-19 NOTE — PROGRESS NOTES
Earlier today while giving pt care at 1400, pt refused to be turned off her sacral wound and onto her side. Primary RN was present. Later the pt's daughter called and requested that we turn the pt on her side. I assisted the primary RN with this at close to 1821. At [address], the pt's daughter stopped me in the [place] to tell me that she had removed the pt's dressing and all of the packing so that she could take pictures. She stated \"we have something we are working on with Juan Jose Brenner and I needed pictures\". I informed the daughter that she should not have done this and went to inform the primary RN immediately.

## 2017-02-19 NOTE — ANESTHESIA POSTPROCEDURE EVALUATION
Post-Anesthesia Evaluation and Assessment    Patient: Meredith Omer MRN: [id number]  SSN: [id number]    YOB: 1945  Age: 66 y.o. Sex: female       Cardiovascular Function/Vital Signs  Visit Vitals    /63    Pulse 60    Temp 36.9 °C (98.4 °F)    Resp 16    Ht 5' 2\" (1.575 m)    Wt 122.9 kg (271 lb)    SpO2 96%    BMI 49.57 kg/m2       Patient is status post general anesthesia for Procedure(s):   INCISION DRAINAGE AND DEBRIDEMENT OF SACRAL DECUBITUS ULCER INCLUDING SUBCUTANEOUS AND FACSIA. Nausea/Vomiting: None    Postoperative hydration reviewed and adequate. Pain:  Pain Scale 1: Visual (02/19/17 0936)  Pain Intensity 1: 0 (02/19/17 0936)   Managed    Neurological Status:   Neuro (WDL): Exceptions to WDL (02/19/17 0936)  Neuro  Neurologic State: Sleeping (02/19/17 0936)  LUE Motor Response: Purposeful (02/19/17 0936)  LLE Motor Response: Purposeful (02/19/17 0936)  RUE Motor Response: Purposeful (02/19/17 0936)  RLE Motor Response: Purposeful (02/19/17 0936)   At baseline    Mental Status and Level of Consciousness: Arousable    Pulmonary Status:   O2 Device: Nasal cannula (02/19/17 0951)   Adequate oxygenation and airway patent    Complications related to anesthesia: None    Post-anesthesia assessment completed.  No concerns    Signed By: Viviane Brown MD     February 19, 2017

## 2017-02-19 NOTE — PROGRESS NOTES
Assessment complete; pt resting in bed with daughter at bedside. Oriented X4. Resp even & unlabored on 2L NC; diminished in bilat lower lobes. No c/o SOB or chest pain. C/O nausea. HR regular; S1, S2 auscultated. Abdomen obese, soft & non-tender with bowel sounds X4 quads. Allevyn over sacrum. Ulcers to bilat heels. Generalized edema. Rivera in place & draining yellow urine. IV site over right hand bruised but patent & infusing IV fluids. Bed in low position with side rails up, call light in reach, no needs voiced at this time; will continue to monitor.

## 2017-02-19 NOTE — PROGRESS NOTES
Pt IV infiltrated. Pt refused IV restart initially but then said it was okay to start IV. Failed first attempt and refused anymore.  Pt states she want IV started in OR room

## 2017-02-19 NOTE — H&P (VIEW-ONLY)
Kael Reeder M.D. Colon and Rectal Surgeon  [address], [address] [place] [address], [facility] Joyce Ulrich   Phone: [phone] Fax: [phone]      Consult Note  2/17/2017    Tyree Akbar  MRN: [id number]    Requesting Provider: Martha Stephens    Primary Care Physician: Tim Noyola MD    Reason for Consult: decubitus    HISTORY OF PRESENT ILLNESS:   Tyree Akbar is a 66y.o. year old female who I was asked to see regarding a sacral decubitus. The patient states she has known about a sacral decubitus since she was at [facility] in November 2016. She states she never saw a wound MD there and was never sent to a wound center for this wound. When [employer] stopped paying,\" she was sent to [place]. At that facility, she has been seeing a wound physician intermittently. She states it was being treated with a VAC, but does not know the details of why that was removed. She states that she has been told day after day that \"the wound looks good, healing well\" up until today, when she was told it \"looks really bad\" and she has to go to the hospital.  She has had no fevers. She had nausea/vomiting tonight due to some medication she received. She describes lots of pain associated with the decub. She has poorly controlled DM. She has previously seen Dr Sinai Hunter in the wound center for heel wounds, and reviewing his notes, she had an exaggerated pain response then too. REVIEW OF SYSTEMS:   Constitutional: No fevers/chills, no weakness, no fatigue, no lightheadedness, no night sweats, no insomnia, no appetite changes, no weight changes, no memory problems. Eyes: No changes in vision, no diplopia, no tearing, no scotomata, no pain   Ears/Nose/Throat/Mouth:  No change in hearing, no tinnitus, no bleeding, no epistaxis, no nasal discharge, no sinusitis.    Respiratory: No cough, +dyspnea, no wheezing, no hemoptysis, no sleep apnea   Cardiovascular: No chest pains, no chest pressure, no chest tightness, no palpitations   Gastrointestinal: No abdominal pains, no bowel habit changes, no nausea, no emesis, no hematochezia, no melena, no cramping, no constipation, no diarrhea, no incontinence, no jaundice, no diverticulosis. Otherwise per HPI   Genitourinary: No dysuria, no hematuria, no urinary frequency, no nocturia, no recent UTIs   Musculoskeletal: No back/neck pain, no arthritis, no joint pain/swelling, no muscle pains   Skin: No rashes, no itching, +ulcers   Hematologic:  No easy bruising, no anemia   Neurologic: No headaches, no dizziness, no seizures   Psychiatric: No depressive symptoms, no anxiety symptoms, no changes in mood, no substance abuse     PAST MEDICAL HISTORY:  OA, asthma, afib, COD, DM2, h/o DVT x2, HTN, neuropathy, kidney stones    PAST SURGICAL HISTORY:  Bilateral heel spurs, hysterectomy, bilateral tennis elbow, right great and second toe amputation, left knee replacement, ORIF right femur, tonsillectomy/adenoids, bilateral Lasik    ALLERGIES: Sulfa, PCN    HOME MEDICATIONS:     Prior to Admission Medications   Prescriptions Last Dose Informant Patient Reported? Taking? B INFANTIS/B ANI/B SARAH/B BIFID (PROBIOTIC 4X PO)   Yes No   Sig: Take 1 Tab by mouth daily. HYDROcodone-acetaminophen (NORCO)  mg tablet   No No   Sig: Take 1 Tab by mouth three (3) times daily as needed for Pain. Max Daily Amount: 3 Tabs. HYDROcodone-acetaminophen (NORCO) 5-325 mg per tablet   No No   Sig: Take 1 Tab by mouth every eight (8) hours as needed for Pain. Max Daily Amount: 3 Tabs. amiodarone (PACERONE) 400 mg tablet   Yes No   Sig: Take 400 mg by mouth two (2) times a day. ascorbic acid, vitamin C, (VITAMIN C) 500 mg tablet   Yes No   Sig: Take 500 mg by mouth two (2) times a day. aspirin delayed-release 81 mg tablet   Yes No   Sig: Take 81 mg by mouth daily.    benzonatate (TESSALON PERLES) 100 mg capsule   Yes No   Sig: Take 100 mg by mouth two (2) times daily as needed for Cough. budesonide-formoterol (SYMBICORT) 160-4.5 mcg/actuation HFA inhaler   Yes No   Sig: Take 2 Puffs by inhalation two (2) times a day. calcium-cholecalciferol, d3, (CALCIUM 600 + D) 600-125 mg-unit tab   Yes No   Sig: Take 1 Tab by mouth daily. cyanocobalamin (VITAMIN B-12) 2,500 mcg sublingual tablet   Yes No   Sig: Take 2,500 mcg by mouth every morning. docusate sodium (COLACE) 50 mg capsule   Yes No   Sig: Take 50 mg by mouth two (2) times a day. doxycycline (MONODOX) 100 mg capsule   Yes No   Sig: Take 100 mg by mouth two (2) times a day. fluticasone-vilanterol (BREO ELLIPTA) 100-25 mcg/dose inhaler   Yes No   Sig: Take 1 Puff by inhalation daily. furosemide (LASIX) 40 mg tablet   Yes No   Sig: Take 40 mg by mouth daily as needed. insulin aspart (NOVOLOG FLEXPEN) 100 unit/mL inpn   Yes No   Si Units by SubCUTAneous route daily. insulin glargine (LANTUS) 100 unit/mL injection   No No   Si Units by SubCUTAneous route nightly. losartan (COZAAR) 25 mg tablet   Yes No   Sig: Take 25 mg by mouth every morning.   magnesium oxide (MAG-OX) 400 mg tablet   Yes No   Sig: Take 400 mg by mouth daily. multivitamin,tx-iron-ca-min (THERA-M) 27-0.4 mg tab   Yes No   Sig: Take 1 Tab by mouth daily. naproxen sodium (ANAPROX DS) 550 mg tablet   No No   Sig: Take 1 Tab by mouth two (2) times daily (with meals). polyethylene glycol (MIRALAX) 17 gram packet   Yes No   Sig: Take 17 g by mouth two (2) times a day. pravastatin (PRAVACHOL) 40 mg tablet   Yes No   Sig: Take 40 mg by mouth nightly. rivaroxaban (XARELTO) 20 mg tab tablet   No No   Sig: Take 1 Tab by mouth nightly. Indications: PREVENT THROMBOEMBOLISM IN CHRONIC ATRIAL FIBRILLATION      Facility-Administered Medications: None       SOCIAL HISTORY:  to her  of 40 years. She lives in [place]. She has five children, 14 grandchildren, and 7 great grandchildren.   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 Nonsmoker. No alcohol    PREVENTION: Never had a colonoscopy     FAMILY HISTORY: No colon or rectal cancer. No history of polyps. No breast, prostate, ovary, endometrial, gastric, or pancreatic cancers. PHYSICAL EXAM:  Blood pressure 136/58, pulse 60, temperature 97.1 °F (36.2 °C), resp. rate 14, height 5' 2\" (1.575 m), weight 271 lb (122.9 kg), SpO2 99 %. Body mass index is 49.57 kg/(m^2). The patient was evaluated in the presence of a medical assistant  General: Normotensive, in no acute distress, well developed, well nourished appearing   Head:  AT/NC, no lesions   Eyes:  PERRLA, EOM's full, conjunctivae clear   Neck:  Supple, no masses, no lymphadenopathy, no thyromegaly, no carotid bruits   Chest:  Lungs clear, no rales, no rhonchi, no wheezes   Heart:  irreg, no murmurs, no rubs, no gallops   Abdomen:  Soft, obese, no tenderness, no rebound, no guarding, no masses, nondistended. Well healed lower midline, no hernia   Rectal:  Deferred   Back:  Normal curvature, no tenderness. Negative Roca's punch. Extremities:  FROM, no deformities, no edema, no erythema   Neuro:  Physiologic, oriented x3, full affect, no localizing findings   Skin:  Unremarkable upper extremities. Left heel has a small eschar, no erythema, no drainage  Right heel has an unstageable ulcer with a leathery black eschar. No tenderness, no erythema, no drainge. Her sacral area was evaluated in the LLD position. At the skin level, there are skin changes with bluish/purple discoloration around the upper aspect of the wound. The tissue is not necrotic, and bleeds and has sensation. Inferiorly, there is a 2x2 cm open area of partial thickness skin breakdown. It is about 1 cm deep and there is a 1 cm wide skin bridge just above it. Above the skin bridge is a slightly larger open wound that is 2.5 x 2.5 cm in size. This one is much deeper, and is probably 3 cm deep.   There is no exposed bone in the base of the wound. There is minimal superior and right lateral undermining. There is significant undermining in the left lateral/left upper direction of at least 6-8 cm. The base of the wound has necrotic, soupy slough. Verbal consent was obtained. This subcutaneous tissue was sharply excisionally debrided with scissors. The tissue was dead and no anesthesia was given. Despite the tissue being necrotic and liquifying, the patient reported feeling pain associated with the procedure. All visible necrotic tissue was removed, but exam was limited by poor lighting in the room. Recent Labs      02/17/17   1402   WBC  11.9*   HGB  9.2*   HCT  29.5*   PLT  350       Recent Labs      02/17/17   1402   NA  139   K  4.5   CL  104   CO2  31   BUN  52*   CREA  1.38*   TBILI  0.4   SGOT  39*   ALT  45   AP  133       UA positive 2/14/17 with large LE and 4+ bacteria  UA still positive 2/17/17 with large LE and 2+ bacteria, 20-50 WBC  A1c 7.9  CRP 4.7  Lactate 1.6  Blood cultures 2/17/17--pending  Wound culture 2/17/17--pending  Labs reviewed by me. ASSESSMENT:  Lalita sacral decubitus, stage III  Bilateral heel wounds    RECOMMENDATIONS:  --bedside debridement tonight  --aggressive offloading. Side to side rolls, avoid laying on her back  --tight glucose control  --on vanc, cleocin, and aztreonam.  antibiotics per primary.   I think the leukocytosis is more likely from her UTI than this chronic wound  --Will start with dry gauze packing, as I am hoping that removing the gauze will hep continue the mechanical debridement  --ok to have diet for now, NPO on Sunday morning  --hold xarelto for now  --reevaluate wound and consider operative debridement Sunday, after Xarelto has worn off  --wound care consult for their input  --offload heals, follow these areas conservatively  --cultures have already been done and are pending.  --will follow      Micah Sanchez MD  2/17/2017      Elements of this note have been created with speech-recognition software. As a result, errors in speech recognition may have occurred.

## 2017-02-19 NOTE — PROGRESS NOTES
TRANSFER - OUT REPORT:    Verbal report given to Juan Diego Fernandes (name) on Fish Tavarez  being transferred to OR (unit) for ordered procedure       Report consisted of patients Situation, Background, Assessment and   Recommendations(SBAR). Information from the following report(s) SBAR, Kardex, Intake/Output, MAR and Recent Results was reviewed with the receiving nurse. Lines:   Peripheral IV 02/19/17 Right Hand (Active)   Site Assessment Clean, dry, & intact 2/19/2017  7:35 AM   Phlebitis Assessment 0 2/19/2017  7:35 AM   Infiltration Assessment 0 2/19/2017  7:35 AM   Dressing Status Clean, dry, & intact 2/19/2017  7:35 AM   Dressing Type Tape;Transparent 2/19/2017  7:35 AM   Hub Color/Line Status Infusing;Patent 2/19/2017  7:35 AM   Alcohol Cap Used No 2/19/2017  5:28 AM        Opportunity for questions and clarification was provided.       Patient transported with:   O2 @ 2L liters

## 2017-02-19 NOTE — PERIOP NOTES
TRANSFER - IN REPORT:    Verbal report received from [facility] on Pegge Sera  being received from med surg for routine progression of care      Report consisted of patients Situation, Background, Assessment and   Recommendations(SBAR). Information from the following report(s) SBAR was reviewed with the receiving nurse. Opportunity for questions and clarification was provided. Assessment completed upon patients arrival to unit and care assumed.

## 2017-02-19 NOTE — PROGRESS NOTES
Pharmacokinetic Consult to Pharmacist    Dareen Cheadle is a 66 y.o. female being treated for large sacral decubitus ulcer (stage III) with Azactam and Vancomycin. Height: 5' 2\" (157.5 cm)  Weight: 122.9 kg (271 lb)  Lab Results   Component Value Date/Time    BUN 38 02/19/2017 05:45 AM    Creatinine 1.04 02/19/2017 05:45 AM    WBC 9.4 02/19/2017 05:45 AM    Procalcitonin 1.0 11/25/2016 11:05 AM      Estimated Creatinine Clearance: 60.2 mL/min (based on Cr of 1.04). CULTURES:  Blood - NG x 2 days  Wound (2/17) - GNR and GPC  Repeat wound (2/19) - pending  Tissue - pending      Day 3 of vancomycin. Goal trough is 15 - 20. Based on improvement in renal function, changed Vancomycin to 1750 mg IV every 12 hours. Pt was in surgery this morning (for I & D). Therefore, change in dosing started at 1100, when pt returned to floor. Trough level is scheduled for tomorrow at 1000, prior to 1100 vancomycin dose. Will continue to follow patient.       Thank you,    Tracey Arora, PharmD

## 2017-02-19 NOTE — ANESTHESIA PREPROCEDURE EVALUATION
Anesthetic History     Other anesthesia complications     Comments: Nov 2016- ORIF femur-- unstable BPs in OR--had to remain intubated at end of surgery and went to ICU--was in ICU with UTI related sepsis for next 3 days     Review of Systems / Medical History  Patient summary reviewed, nursing notes reviewed and pertinent labs reviewed    Pulmonary    COPD: mild        Asthma : well controlled       Neuro/Psych   Within defined limits          Comments: Peripheral neuropathy Cardiovascular    Hypertension        Dysrhythmias (paroxysmal) : atrial fibrillation  Pacemaker (for SSS) and hyperlipidemia         GI/Hepatic/Renal         Renal disease: CRI       Endo/Other    Diabetes: type 2, using insulin    Morbid obesity, arthritis and anemia     Other Findings   Comments: Hx DVT           Physical Exam    Airway  Mallampati: III  TM Distance: 4 - 6 cm    Mouth opening: Normal     Cardiovascular  Regular rate and rhythm,  S1 and S2 normal,  no murmur, click, rub, or gallop             Dental    Dentition: Full lower dentures and Full upper dentures     Pulmonary                 Abdominal         Other Findings            Anesthetic Plan    ASA: 4  Anesthesia type: general        Post procedure ventilation   Induction: Intravenous  Anesthetic plan and risks discussed with: Patient      Discussed post-op ventilation as a risk--

## 2017-02-19 NOTE — PROGRESS NOTES
Ena Real M.D. Colon and Rectal Surgeon  [address], [address] [place] [address], [facility] Joyce Ulrich   Phone: [phone] Fax: Juliana Lagunas  MRN: [id number]    HISTORY OF PRESENT ILLNESS:   Mak Burgos is a 66y.o. year old female who I was asked to see regarding a sacral decubitus. The patient stated she has known about a sacral decubitus since she was at [facility] in November 2016. She never saw a wound MD there and was never sent to a wound center for this wound. When [employer] stopped paying,\" she was sent to [place]. At that facility, she had been seeing a wound physician intermittently. She states it was being treated with a VAC, but does not know the details of why that was removed. She stated that she has been told day after day that \"the wound looks good, healing well\" up until 2/17/17, when she was told it \"looks really bad\" and she has to go to the hospital.      No fevers. More nausea/vomiting last night. She describes lots of pain associated with the decub. Ready for debridement today    REVIEW OF SYSTEMS:   Constitutional: No fevers/chills, no weakness, no fatigue, no lightheadedness, no night sweats, no insomnia, no appetite changes, no weight changes, no memory problems. Respiratory: No cough, +dyspnea, no wheezing, no hemoptysis, no sleep apnea   Cardiovascular: No chest pains, no chest pressure, no chest tightness, no palpitations   Gastrointestinal: No abdominal pains, no bowel habit changes, no nausea, no emesis, no hematochezia, no melena, no cramping, no constipation, no diarrhea, no incontinence, no jaundice, no diverticulosis. Otherwise per HPI     PHYSICAL EXAM:  Blood pressure 172/62, pulse (!) 55, temperature 97 °F (36.1 °C), resp. rate 18, height 5' 2\" (1.575 m), weight 271 lb (122.9 kg), SpO2 98 %. Body mass index is 49.57 kg/(m^2).   The patient was evaluated in the presence of a medical assistant  General: Normotensive, in no acute distress, well developed, well nourished appearing   Chest:  Lungs clear, no rales, no rhonchi, no wheezes   Heart:  irreg, no murmurs, no rubs, no gallops   Abdomen:  Soft, obese, no tenderness, no rebound, no guarding, no masses, nondistended. Well healed lower midline, no hernia   Skin:  Unremarkable upper extremities. Left heel has a small eschar, no erythema, no drainage  Right heel has an unstageable ulcer with a leathery black eschar. No tenderness, no erythema, no drainge. Her sacral area was evaluated in the LLD position. At the skin level, there are skin changes with bluish/purple discoloration around the upper aspect of the wound. The tissue is not necrotic, and bleeds and has sensation. Inferiorly, there is a 2x2 cm open area of partial thickness skin breakdown. It is about 1 cm deep and there is a 1 cm wide skin bridge just above it. Above the skin bridge is a slightly larger open wound that is 2.5 x 2.5 cm in size. This one is much deeper, and is probably 3 cm deep. There is no exposed bone in the base of the wound. There is minimal superior and right lateral undermining. There is significant undermining in the left lateral/left upper direction of at least 6-8 cm. The base of the wound has necrotic, soupy slough.              Recent Labs      02/19/17   0545  02/18/17   0516  02/17/17   1402   WBC  9.4  10.0  11.9*   HGB  8.0*  8.7*  9.2*   HCT  26.3*  28.1*  29.5*   PLT  292  313  350       Recent Labs      02/19/17   0545  02/18/17   0516  02/17/17   1402   NA  145  143  139   K  4.5  4.5  4.5   CL  109*  106  104   CO2  28  29  31   BUN  38*  52*  52*   CREA  1.04*  1.46*  1.38*   TBILI   --    --   0.4   SGOT   --    --   39*   ALT   --    --   45   AP   --    --   133       UA positive 2/14/17 with large LE and 4+ bacteria  UA still positive 2/17/17 with large LE and 2+ bacteria, 20-50 WBC  A1c 7.9  CRP 4.7  Lactate 1.6  Blood cultures 2/17/17--pending  Wound culture 2/17/17--light GNRs  Labs reviewed by me. ASSESSMENT:  Lalita sacral decubitus, stage III   S/p local debridement at bedside 2/17/17  Bilateral heel wounds    RECOMMENDATIONS:  --aggressive offloading of sacrum. Side to side rolls, avoid laying on her back  --tight glucose control  --on vanc, cleocin, and aztreonam.  antibiotics per primary. I think the leukocytosis is more likely from her UTI than this chronic wound  --continue dry gauze packing, as I am hoping that removing the gauze will hep continue the mechanical debridement  --hold xarelto for now  --reevaluate wound and consider operative debridement Sunday, after Xarelto has worn off  --wound care consult for their input  --offload heals, follow these areas conservatively  --cultures have already been done and are pending.  --will follow    OR today for debridement        Helen Jimenez MD  2/19/2017      Elements of this note have been created with speech-recognition software. As a result, errors in speech recognition may have occurred.

## 2017-02-19 NOTE — PROGRESS NOTES
Care Management Interventions  Transition of Care Consult (CM Consult): Discharge Planning  Current Support Network: Lives with Spouse, [address]  Discharge Location  Discharge Placement: Skilled nursing facility  65 yo F admitted 2/17 for  sacral decubitus ulcer and UTI. Ofilia Moritz saw pt on 2/17 - pt and spouse just got put together in the same room at [facility] and pt afraid they are going to give her bed away. Amy Gusman told Ofilia Moritz hed try to hold the bed. I verified with [facility]) both pt and spouse are in skilled. (Pt had [facility] ED visit 2/3. Had not been able to walk since this past Thanksgiving after she fell and broke her hip. Had gone to [facility] 12/2 after 11/24 [facility] admission. Stayed there a month but transferred to [facility] skilled as  insurance said [facility] was out of network. Had  wound RN for daily dressing changes on her decubitus ulcer).

## 2017-02-20 NOTE — PROGRESS NOTES
Karl Ross from [facility] came to see pt today and to discuss her plans. Sw discussed issues and Karl Ross went to talk with pt. Sw called and spoke with pt's daughter Checo Dumont. She was very sick and strained to talk. After much discussion pt somewhat agreeable to return and states she really wants to be with her . Sw plans to talk with pt again in a few hours and let know of any other bed offers and ask her for her decision in order to let Karl Ross know. Karl Ross has held her bed and will take her back tomorrow if pt decides to return. Surendra Linton informed today that pt does not want to return to [facility] as she blames them for her skin breakdown as well as allowing her  to get sick and [place]. Sw attempted to talk with pt but she escalates and then cries stating I do not want to go back there or [facility]. Daughter in law in room from [facility], but she does not have facts straight about pt. Pt instructed Sw to talk with her daughter Noris Cruz [phone]. Pt also stated she liked [facility]. Sw had to explain to her that that facility did not exist anymore but has merged/reopened as [facility]. Pt stated she would like Sw to check into her going there. Sw will attempt to check into her options with skilled days left as well as Ins network. Sw will cont to follow and assist. Micky Sexton/LMSW        BAIRON spoke with Karl Ross at [facility] and he states he will have a bed for pt in skilled unit. BAIRON sent clinical via FUJIAN HAIYUAN to [facility].

## 2017-02-20 NOTE — PROGRESS NOTES
Hospitalist Progress Note    2017  Admit Date: 2017 12:30 PM   NAME: Rocio Delatorre   :  [id number]   MRN:  [id number]   Attending: Herman Burk MD  PCP:  Orville Wilde MD  Treatment Team: Attending Provider: Herman Burk MD; Consulting Provider: Benitez Hernandez MD    DNR   SUBJECTIVE:   Ms. CUNHA is a 65 yo female who presented with c/o painful, draining sacral decubitus ulcer. Pt is at [facility]. Pt had wound vac at [facility] but was removed 17. She reports she was told the wound was healing and doing better until the wound vac was taken off. Pt underwent surgical debridement 17. She was also found to have UTI on UA. No urine cx sent. Wound cx 17 showing light proteus mirabilis. 17 tissue cx showing light GNRs. Pt on azactam and Vanc. Pt also found to have JORGE which has since resolved. Pt c/o some SOB on movement in the bed this morning. Denies n/v/d, abd pain, CP.       Past Medical History   Diagnosis Date    Arthritis      generalized    Asthma      managed with inhalers    Atrial fibrillation ([address]      managed with medication and pacemaker    Chronic obstructive pulmonary disease ([facility]      managed with inhalers    Diabetes mellitus, insulin dependent (IDDM), uncontrolled- A1c 11.5 2012    Embolism and thrombosis 2016    Hx of blood clots      x 2 right arm    Hypertension      managed with medication     Morbid obesity (HCC)      BMI 49.7    Pacemaker      [place]    Peripheral neuropathy (HCC)      both feet and lower legs    Shortness of breath on exertion      managed with medication     Type 2 diabetes mellitus (HCC) Dx      insulin dependent/ Avg / low BS symptoms @ 60/ last A1C 7    Urinary frequency     Urinary incontinence      Recent Results (from the past 24 hour(s))   GLUCOSE, POC    Collection Time: 17 11:53 AM   Result Value Ref Range    Glucose (POC) 159 (H) 65 - 100 mg/dL   GLUCOSE, POC Collection Time: 02/19/17  5:50 PM   Result Value Ref Range    Glucose (POC) 173 (H) 65 - 100 mg/dL   PLEASE READ & DOCUMENT PPD TEST IN 48 HRS    Collection Time: 02/19/17  6:25 PM   Result Value Ref Range    PPD Negative Negative    mm Induration 1.5TL mm   METABOLIC PANEL, BASIC    Collection Time: 02/20/17  5:38 AM   Result Value Ref Range    Sodium 144 136 - 145 mmol/L    Potassium 4.5 3.5 - 5.1 mmol/L    Chloride 111 (H) 98 - 107 mmol/L    CO2 27 21 - 32 mmol/L    Anion gap 6 (L) 7 - 16 mmol/L    Glucose 161 (H) 65 - 100 mg/dL    BUN 27 (H) 8 - 23 MG/DL    Creatinine 0.91 0.6 - 1.0 MG/DL    GFR est AA >60 >60 ml/min/1.73m2    GFR est non-AA >60 >60 ml/min/1.73m2    Calcium 8.5 8.3 - 10.4 MG/DL   CBC WITH AUTOMATED DIFF    Collection Time: 02/20/17  5:38 AM   Result Value Ref Range    WBC 7.2 4.3 - 11.1 K/uL    RBC 2.62 (L) 4.05 - 5.25 M/uL    HGB 7.8 (L) 11.7 - 15.4 g/dL    HCT 26.1 (L) 35.8 - 46.3 %    MCV 99.6 (H) 79.6 - 97.8 FL    MCH 29.8 26.1 - 32.9 PG    MCHC 29.9 (L) 31.4 - 35.0 g/dL    RDW 17.1 (H) 11.9 - 14.6 %    PLATELET 418 865 - 809 K/uL    MPV 10.4 (L) 10.8 - 14.1 FL    DF AUTOMATED      NEUTROPHILS 73 43 - 78 %    LYMPHOCYTES 16 13 - 44 %    MONOCYTES 9 4.0 - 12.0 %    EOSINOPHILS 1 0.5 - 7.8 %    BASOPHILS 0 0.0 - 2.0 %    IMMATURE GRANULOCYTES 0.6 0.0 - 5.0 %    ABS. NEUTROPHILS 5.2 1.7 - 8.2 K/UL    ABS. LYMPHOCYTES 1.2 0.5 - 4.6 K/UL    ABS. MONOCYTES 0.7 0.1 - 1.3 K/UL    ABS. EOSINOPHILS 0.1 0.0 - 0.8 K/UL    ABS. BASOPHILS 0.0 0.0 - 0.2 K/UL    ABS. IMM.  GRANS. 0.0 0.0 - 0.5 K/UL   GLUCOSE, POC    Collection Time: 02/20/17  6:35 AM   Result Value Ref Range    Glucose (POC) 163 (H) 65 - 100 mg/dL     Allergies   Allergen Reactions    Penicillins Rash    Sulfa (Sulfonamide Antibiotics) Hives     Current Facility-Administered Medications   Medication Dose Route Frequency Provider Last Rate Last Dose    albuterol CONCENTRATE 2.5mg/0.5 mL neb soln  2.5 mg Nebulization Q6HWA RT Chloe PORTILLO MD Mark   2.5 mg at 02/20/17 0820    And    budesonide (PULMICORT) 500 mcg/2 ml nebulizer suspension  500 mcg Nebulization BID RT Brook Scheuermann, MD   500 mcg at 02/20/17 0820    vancomycin (VANCOCIN) 1750 mg in  ml infusion  1,750 mg IntraVENous Q12H Brook Scheuermann,  mL/hr at 02/19/17 2341 1,750 mg at 02/19/17 2341    VANCOMYCIN INFORMATION NOTE   Other ONCE Brook Scheuermann, MD        sodium chloride (NS) flush 5-10 mL  5-10 mL IntraVENous Q8H Ana Gaston MD   10 mL at 02/20/17 0603    sodium chloride (NS) flush 5-10 mL  5-10 mL IntraVENous PRN Ana Gaston MD   10 mL at 02/18/17 2154    amiodarone (CORDARONE) tablet 200 mg  200 mg Oral BID Brook Scheuermann, MD   200 mg at 02/20/17 0831    ascorbic acid (vitamin C) (VITAMIN C) tablet 500 mg  500 mg Oral BID Brook Scheuermann, MD   500 mg at 02/20/17 0831    aspirin delayed-release tablet 81 mg  81 mg Oral DAILY Brook Scheuermann, MD   81 mg at 02/20/17 0831    docusate sodium (COLACE) capsule 100 mg  100 mg Oral BID Brook Scheuermann, MD   100 mg at 02/20/17 0831    insulin glargine (LANTUS) injection 5 Units  5 Units SubCUTAneous QHS Brook Scheuermann, MD   5 Units at 02/19/17 2145    losartan (COZAAR) tablet 25 mg  25 mg Oral DAILY Rhoda Rai MD   25 mg at 02/20/17 0831    polyethylene glycol (MIRALAX) packet 17 g  17 g Oral BID Brook Scheuermann, MD   17 g at 02/20/17 0831    pravastatin (PRAVACHOL) tablet 40 mg  40 mg Oral QHS Brook Scheuermann, MD   40 mg at 02/19/17 2145    sodium chloride (NS) flush 5-10 mL  5-10 mL IntraVENous Q8H Brook Scheuermann, MD   10 mL at 02/20/17 0603    sodium chloride (NS) flush 5-10 mL  5-10 mL IntraVENous PRN Brook Scheuermann, MD        acetaminophen (TYLENOL) tablet 650 mg  650 mg Oral Q6H PRN Brook Scheuermann, MD        HYDROcodone-acetaminophen (NORCO)  mg tablet 1 Tab  1 Tab Oral Q4H PRN Chloe Flores, MD   1 Tab at 17 1610    HYDROmorphone (PF) (DILAUDID) injection 1 mg  1 mg IntraVENous Q4H PRN Emmy Johnson MD   1 mg at 17 1000    naloxone (NARCAN) injection 0.4 mg  0.4 mg IntraVENous PRN Emmy Johnson MD        ondansetron [facility]) injection 4 mg  4 mg IntraVENous Q4H PRN Emmy Johnson MD   4 mg at 17 0747    aztreonam (AZACTAM) 1 g in 0.9% sodium chloride (MBP/ADV) 100 mL  1 g IntraVENous Carmela Chaney  mL/hr at 17 0833 1 g at 17 0048    Saccharomyces boulardii (FLORASTOR) capsule 500 mg  500 mg Oral BID Emmy Johnson MD   500 mg at 17 0831    hydrALAZINE (APRESOLINE) 20 mg/mL injection 10 mg  10 mg IntraVENous Q6H PRN Emmy Johnson MD        0.9% sodium chloride infusion  125 mL/hr IntraVENous CONTINUOUS Emmy Johnson  mL/hr at 17 2340 125 mL/hr at 17 2340    insulin lispro (HUMALOG) injection   SubCUTAneous Q6H Emmy Johnson MD   2 Units at 17 3944       Review of Systems negative with exception of pertinent positives noted above  PHYSICAL EXAM     Visit Vitals    /67 (BP 1 Location: Left arm, BP Patient Position: At rest)    Pulse 63    Temp 97.4 °F (36.3 °C)    Resp 18    Ht 5' 2\" (1.575 m)    Wt 122.9 kg (271 lb)    SpO2 97%    BMI 49.57 kg/m2      Temp (24hrs), Av.7 °F (36.5 °C), Min:95.9 °F (35.5 °C), Max:98.4 °F (36.9 °C)    Oxygen Therapy  O2 Sat (%): 97 % (17)  Pulse via Oximetry: 61 beats per minute (17)  O2 Device: Nasal cannula (17)  O2 Flow Rate (L/min): 1 l/min (02/20/17 0820)    Intake/Output Summary (Last 24 hours) at 17 1016  Last data filed at 17 0915   Gross per 24 hour   Intake             1380 ml   Output             1750 ml   Net             -370 ml      General: No acute distress, obese    Lungs: CTA bilaterally.  Resp even and nonlabored  Heart:  S1S2 present without murmurs rubs gallops. RRR. No edema  Abdomen: Soft, non tender, obese. BS present  Extremities: Multiple toes bilateral feet amputated. Moves ext well  Neurologic:  No focal deficits  Psych: A/O X3  Skin:  Sacral decub with dressing dry/intact      Results summary of Diagnostic Studies/Procedures copied from within [facility] EMR:         [address] Problems    Diagnosis Date Noted    Sacral decubitus ulcer 02/17/2017    Atrial fibrillation ([facility] 02/17/2017    Chronic anticoagulation 02/17/2017    Diabetes mellitus type 2 with complications ([facility] 72/80/1009    COPD (chronic obstructive pulmonary disease) ([facility] 02/17/2017    Hypertension 11/24/2016    Pacemaker DDD [facility] 01/07/2013    UTI (urinary tract infection) 12/29/2012     Plan:    Sacral decub, stage III: Surgery following, s/p debridement 2/19/2017. Wound care consult. Continue Azactam and Vanc. Follow wound cx    UTI: no urine cx, continue azactam and Vancomyin, IVF    JORGE: resolved, follow BUN, Creatinine    Afib: holding xarelto, continue amiodarone    HTN: continue current regimen    DM II uncontrolled, hyperglycemia: BGL more controlled. Diabetic diet.  A1C 7.9. continue SSI, lantus    DVT Prophylaxis: SCDs  Plan of Care Discussed with: Supervising MD Dr. Rubi Ortiz, care team, pt   Jessica Graham, NP

## 2017-02-20 NOTE — PROGRESS NOTES
Marcos Aase, M.D. Colon and Rectal Surgeon  [facility]  [address], [address] [facility], [address]  Phone: [phone] Fax: Schuyler Mancera  MRN: [id number]    HISTORY OF PRESENT ILLNESS:   Dareen Cheadle is a 66y.o. year old female who I was asked to see regarding a sacral decubitus. The patient stated she has known about a sacral decubitus since she was at [facility] in November 2016. She never saw a wound MD there and was never sent to a wound center for this wound. When [employer] stopped paying,\" she was sent to [facility]. At that facility, she had been seeing a wound physician intermittently. She states it was being treated with a VAC, but does not know the details of why that was removed. She stated that she has been told day after day that \"the wound looks good, healing well\" up until 2/17/17, when she was told it \"looks really bad\" and she has to go to the hospital.      Taken for debridement 2/19/17. She has moderate incisional pain, but is otherwise doing okay. Nausea/vomiting improved. She has been seen by wound care    REVIEW OF SYSTEMS:   Constitutional: No fevers/chills, no weakness, no fatigue, no lightheadedness, no night sweats, no insomnia, no appetite changes, no weight changes, no memory problems. Respiratory: No cough, +dyspnea, no wheezing, no hemoptysis, no sleep apnea   Cardiovascular: No chest pains, no chest pressure, no chest tightness, no palpitations   Gastrointestinal: No abdominal pains, no bowel habit changes, no nausea, no emesis, no hematochezia, no melena, no cramping, no constipation, no diarrhea, no incontinence, no jaundice, no diverticulosis. Otherwise per HPI     PHYSICAL EXAM:  Blood pressure 146/60, pulse 65, temperature 97.3 °F (36.3 °C), resp. rate 18, height 5' 2\" (1.575 m), weight 271 lb (122.9 kg), SpO2 96 %. Body mass index is 49.57 kg/(m^2).   The patient was evaluated in the presence of a medical assistant  General: Normotensive, in no acute distress, well developed, well nourished appearing   Chest:  Lungs clear, no rales, no rhonchi, no wheezes   Heart:  irreg, no murmurs, no rubs, no gallops   Abdomen:  Soft, obese, no tenderness, no rebound, no guarding, no masses, nondistended. Well healed lower midline, no hernia   Skin:  Unremarkable upper extremities. Left heel has a small eschar, no erythema, no drainage  Right heel has an unstageable ulcer with a leathery black eschar. No tenderness, no erythema, no drainge. Dressings had just been changed and she requested I not take them down. From 2/17/17: \"Her sacral area was evaluated in the LLD position. At the skin level, there are skin changes with bluish/purple discoloration around the upper aspect of the wound. The tissue is not necrotic, and bleeds and has sensation. Inferiorly, there is a 2x2 cm open area of partial thickness skin breakdown. It is about 1 cm deep and there is a 1 cm wide skin bridge just above it. Above the skin bridge is a slightly larger open wound that is 2.5 x 2.5 cm in size. This one is much deeper, and is probably 3 cm deep. There is no exposed bone in the base of the wound. There is minimal superior and right lateral undermining. There is significant undermining in the left lateral/left upper direction of at least 6-8 cm. The base of the wound has necrotic, soupy slough. \"         Recent Labs      02/20/17   0538  02/19/17   0545  02/18/17   0516   WBC  7.2  9.4  10.0   HGB  7.8*  8.0*  8.7*   HCT  26.1*  26.3*  28.1*   PLT  276  292  313       Recent Labs      02/20/17   0538  02/19/17   0545  02/18/17   0516   NA  144  145  143   K  4.5  4.5  4.5   CL  111*  109*  106   CO2  27  28  29   BUN  27*  38*  52*   CREA  0.91  1.04*  1.46*       UA positive 2/14/17 with large LE and 4+ bacteria  UA still positive 2/17/17 with large LE and 2+ bacteria, 20-50 WBC  A1c 7.9  CRP 4.7  Lactate 1.6  Blood cultures 2/17/17--negative x3 days  Wound culture 2/17/17--light proteus mirabalis  Wound culture 2/19/17--light GNRs  Labs reviewed by me. ASSESSMENT:  Lalita sacral decubitus, stage III   S/p local debridement at bedside 2/17/17   S/p operative debridement of skin, SQ, and fascia 2/19/17  Bilateral heel wounds    RECOMMENDATIONS:  --aggressive offloading of sacrum. Side to side rolls, avoid laying on her back  --tight glucose control  --on vanc, cleocin, and aztreonam.  antibiotics per primary. I think the leukocytosis is more likely from her UTI than this chronic wound  --continue dry gauze packing, as I am hoping that removing the gauze will hep continue the mechanical debridement  --hold xarelto for now. prob ok to start tomorrow. --wound care consulted for their input  --offload heals, follow these areas conservatively  --will follow      Helen Jimenez MD  2/20/2017      Elements of this note have been created with speech-recognition software. As a result, errors in speech recognition may have occurred.

## 2017-02-20 NOTE — WOUND CARE
Patient seen for sacral and bilateral heel wounds. Both heels have unstagable eschars. Right 3.5x2.5 cm and left 1.5x3.0 cm. Rooke boots ordered. Mepilex foam placed to protect. Sacral wound unpacked and assessed. Is 5cm x 4cm and 3 cm deep with pocket of 4.5 that is not undermining. Will continue saline soaks for now. Kerlix roll best packing materials. Recommend at least twice a day and may use Tegaderm to seal dressing. Discussed with patient. Daughter not present. Is on [place], will order Envision low air loss mattress. Primary nurse was present and assisted with dressing. Updated on treatment plan. Wound team will monitor. 1440: discussed with Dr Young Elliott.

## 2017-02-20 NOTE — OP NOTES
[address]   OPERATIVE REPORT       Name:  Earlene Garcia   MR#:  [id number]   :  1945   Account #:  [de-identified]   Date of Adm:  2017       PREOPERATIVE DIAGNOSIS: Sacral decubitus. POSTOPERATIVE DIAGNOSIS: Stage III sacral decubitus. PROCEDURE PERFORMED: Incision, drainage, and debridement of sacral wound, including skin, subcutaneous tissue, and fascia. SURGEON: Deyanira Nunez MD    ASSISTANT: None. ANESTHESIA: General.    SPECIMENS   1. Wound culture for Gram stain and aerobic culture. 2. Wound tissue for culture. BRIEF DESCRIPTION OF THE FINDINGS: Sacral wound measuring 5 cm  laterally x 4 cm craniocaudally and 3 cm deep. It extended down  to the sacral fascia, but there was no exposed bone. All of the  necrotic tissue was debrided away. There was significant  undermining to the left side of at least 5 cm, with minimal  undermining elsewhere. BRIEF HISTORY: Miss Yazmin Carrillo is a 66-year-old female with limited  mobility and uncontrolled diabetes. She has been in a skilled  nursing facility and tells me that this wound has been present  for the last 3 months or so. It reportedly acutely worsened on  Friday and she was brought to the hospital. I attempted bedside  debridement, but she had significant discomfort, so we scheduled  operative intervention. The patient was evaluated preoperatively. We discussed the  planned operation, risks, benefits, and alternatives. DESCRIPTION OF PROCEDURE: She was brought to the operating room  and general anesthesia was administered. She was placed prone on  the OR table and all pressure points were padded. Buttocks were  prepped and draped in a sterile fashion. A time-out was  performed. I evaluated the wound. There was some very superficial necrosis  of the skin along the right side of the wound. This was sharply  debrided partial thickness with a scalpel in an excisional  manner.  This got down to healthy bleeding tissue. Size of debridement area here was 6x2 cm. I then  debrided the skin bridge crossing the middle of the wound. This  was debrided partial thickness through the skin using Metzenbaum  scissors and scalpel in an excisional fashion. This wound was 2x2. I then debrided  the deeper components of the wound. To the right of midline, the  wound included full-thickness skin and subcutaneous tissues. All  of the necrotic tissue was debrided back to pink, healthy,  bleeding tissue. The size of this deeper wound debridement was listed above, 5x4x3 cm. I moved to the left side of the wound. This was also debrided in  a sharp excisional fashion using a combination of scalpel and  Metzenbaum scissors. On this side, the wound was slightly deeper  and extended down to the sacral fascia. The skin, subcutaneous  tissues, and fascia was debrided on this side. There use  disordered hypertrophic granulation tissue in some of the wound,  which was debrided with a curette. The wound was irrigated and  Inspected. At the completion of the case, all of the exposed tissues were pink and healthy with no necrosis and good gentle oozing. 30 mL of 0.25% Marcaine was injected into the  subcutaneous tissues and skin for postoperative analgesia. he  wound was packed with a Kerlix gauze to assure hemostasis. The   patient was placed back supine, awakened, extubated, and taken  to recovery in stable condition. Sponge, instrument, and needle  counts were correct.       Chaz Fernandez MD      BS / CR   D:  02/19/2017   09:43   T:  02/20/2017   04:05   Job #:  [id number]

## 2017-02-20 NOTE — PROGRESS NOTES
Bedside and Verbal shift change report given to [address] (oncoming nurse) by Tracey Rivas (offgoing nurse). Report included the following information SBAR, Kardex, Intake/Output, MAR and Recent Results.

## 2017-02-20 NOTE — PROGRESS NOTES
Pharmacokinetic Consult to Pharmacist    De Wittmarge rPitchard is a 66 y.o. female being treated for SSTI with Vancomycin and Azactam.    @FLOW(11)@  @XBAE(21)@  Lab Results   Component Value Date/Time    BUN 27 02/20/2017 05:38 AM    Creatinine 0.91 02/20/2017 05:38 AM    WBC 7.2 02/20/2017 05:38 AM    Procalcitonin 1.0 11/25/2016 11:05 AM      Estimated Creatinine Clearance: 68.8 mL/min (based on Cr of 0.91). CULTURES:  All Micro Results       Procedure Component Value Units Date/Time      CULTURE, ANAEROBIC [[id number]] Collected:  02/19/17 0910    Order Status:  Completed Specimen:  Wound Drainage Updated:  02/20/17 1001     Special Requests: SWAB 2 SACRAL DECUBITUS TISSUE      Culture result:         SUBCULTURE IS NECESSARY TO DETERMINE PRESENCE OR ABSENCE OF ANAEROBIC BACTERIA IN THIS CULTURE. FURTHER REPORT TO FOLLOW AFTER INCUBATION OF SUBCULTURE.     CULTURE, TISSUE Regan Bainsia STAIN [[id number]]  (Abnormal) Collected:  02/19/17 0911    Order Status:  Completed Specimen:  Tissue Updated:  02/20/17 0919     Special Requests: SWAB 3 SACRAL DECUBITUS      GRAM STAIN FEW  GRAM NEGATIVE RODS         FEW  GRAM POSITIVE COCCI         0 TO 3 WBC'S/OIF      Culture result: LIGHT  GRAM NEGATIVE RODS  SUBCULTURE IN PROGRESS   (A)     CULTURE, Audrea Crape STAIN [[id number]] Collected:  02/19/17 7248    Order Status:  Completed Specimen:  Sacral Wound Updated:  02/20/17 0910     Special Requests: SWAB 1      GRAM STAIN 0 TO 3 WBC'S/OIF       MODERATE  GRAM POSITIVE COCCI         FEW  GRAM NEGATIVE RODS        Culture result: ORGANISM IN STUDY       CULTURE, Audrea Crape STAIN [[id number]]  (Abnormal)  (Susceptibility) Collected:  02/17/17 1433    Order Status:  Completed Specimen:  Abscess Updated:  02/20/17 0759     Special Requests: BUTTOCKS      GRAM STAIN 10 TO 25 WBC'S/OIF       MODERATE  GRAM NEGATIVE RODS         MODERATE  GRAM POSITIVE COCCI        Culture result: LIGHT  PROTEUS MIRABILIS   (A)               CULTURE IN PROGRESS,FURTHER UPDATES TO FOLLOW    CULTURE, BLOOD [[id number]] Collected:  02/17/17 1402    Order Status:  Completed Specimen:  Blood from Left Hand Updated:  02/20/17 0738     Special Requests: NO SPECIAL REQUESTS        Culture result: NO GROWTH 3 DAYS       CULTURE, BLOOD [[id number]] Collected:  02/17/17 1402    Order Status:  Completed Specimen:  Whole Blood from Right Hand Updated:  02/20/17 0738     Special Requests: NO SPECIAL REQUESTS        Culture result: NO GROWTH 3 DAYS       MRSA SCREEN - PCR (NASAL) [[id number]] Collected:  02/17/17 1640    Order Status:  Completed Specimen:  Nasal from Nares Updated:  02/17/17 1908     Special Requests: NO SPECIAL REQUESTS        Culture result:         MRSA target DNA is not detected (presumptive not colonized with MRSA)              Lab Results   Component Value Date/Time    Vancomycin,trough 27.3 02/20/2017 10:19 AM       Day 4 of vancomycin. Goal trough is 12 - 18. Will change to Vancomycin 1 gram IV every 12 hours starting tonight at 2300. Will continue to follow patient.       Thank you,  Elida OdenD, BCPS

## 2017-02-20 NOTE — PROGRESS NOTES
Patient very upset, informed by family that her  was sent to the ED downtown and was unresponsive. Attempts by RN to contact primary RN for  were unsuccessful (on hold for 10+ minutes). Also upset that they will no longer have rooms at the SNF where she and her  were residents. Would like to speak with  tomorrow am. Wants to be transferred downtown to be with , and to make it easier on family to visit both parents.

## 2017-02-20 NOTE — PROGRESS NOTES
Assessment completed via flowsheet. Patient alert and oriented x4. Heart and lung sounds clear, regular, and unlabored. Lung sounds diminished in the lower lobes bilaterally. 02 via nasal canula at 2 l/min. Abd soft, non-tender with active bowel sounds x4 quadrants. Patient has not had a bowel movement since admission, Miralax to be given as ordered. Rivera catheter in place, patent, draining clear yellow urine. IVF infusing without difficulty, site c/d/i. SCDs in place bilaterally and operational. Sacral wound, Allevyn dressing in place. Patient denies pain at this time. Instructed to call with needs. Bed low and locked, call light within reach.

## 2017-02-21 NOTE — PROGRESS NOTES
Pharmacokinetic Consult to Pharmacist    Odalys Diamond is a 66 y.o. female being treated for SSTI/decubitus ulcer with Vancomycin and Azactam.    @JOHNATHONWA(05)@  @[facility](86)@  Lab Results   Component Value Date/Time    BUN 21 02/21/2017 06:27 AM    Creatinine 0.83 02/21/2017 06:27 AM    WBC 7.8 02/21/2017 06:27 AM    Procalcitonin 1.0 11/25/2016 11:05 AM      Estimated Creatinine Clearance: 75.5 mL/min (based on Cr of 0.83).     CULTURES:  All Micro Results       Procedure Component Value Units Date/Time      CULTURE, TISSUE Vernell Marinette STAIN [[id number]]  (Abnormal) Collected:  02/19/17 0911    Order Status:  Completed Specimen:  Tissue Updated:  02/21/17 0913     Special Requests: SWAB 3 SACRAL DECUBITUS      GRAM STAIN FEW  GRAM NEGATIVE RODS         FEW  GRAM POSITIVE COCCI         0 TO 3 WBC'S/OIF      Culture result: LIGHT  GRAM NEGATIVE RODS  IDENTIFICATION AND SUSCEPTIBILITY TO FOLLOW   (A)     CULTURE, Kiki Clamp STAIN [[id number]]  (Abnormal) Collected:  02/19/17 0907    Order Status:  Completed Specimen:  Sacral Wound Updated:  02/21/17 0904     Special Requests: SWAB 1      GRAM STAIN 0 TO 3 WBC'S/OIF       MODERATE  GRAM POSITIVE COCCI         FEW  GRAM NEGATIVE RODS        Culture result: MODERATE  GRAM POSITIVE COCCI  IDENTIFICATION AND SUSCEPTIBILITY TO FOLLOW   (A)     CULTURE, WOUND Vernell Marinette STAIN [[id number]]  (Abnormal)  (Susceptibility) Collected:  02/17/17 1433    Order Status:  Completed Specimen:  Abscess Updated:  02/21/17 0800     Special Requests: BUTTOCKS      GRAM STAIN 10 TO 25 WBC'S/OIF       MODERATE  GRAM NEGATIVE RODS         MODERATE  GRAM POSITIVE COCCI        Culture result: LIGHT  PROTEUS MIRABILIS   (A)       LIGHT  GRAM NEGATIVE RODS  IDENTIFICATION AND SUSCEPTIBILITY TO FOLLOW   (A)       LIGHT  ALPHA STREPTOCOCCUS   (A)     CULTURE, BLOOD [[id number]] Collected:  02/17/17 1402    Order Status:  Completed Specimen:  Whole Blood from Right Hand Updated:  02/21/17 5359     Special Requests: NO SPECIAL REQUESTS        Culture result: NO GROWTH 4 DAYS       CULTURE, BLOOD [[id number]] Collected:  02/17/17 1402    Order Status:  Completed Specimen:  Blood from Left Hand Updated:  02/21/17 0605     Special Requests: NO SPECIAL REQUESTS        Culture result: NO GROWTH 4 DAYS       CULTURE, ANAEROBIC [[id number]] Collected:  02/19/17 0910    Order Status:  Completed Specimen:  Wound Drainage Updated:  02/20/17 1001     Special Requests: SWAB 2 SACRAL DECUBITUS TISSUE      Culture result:         SUBCULTURE IS NECESSARY TO DETERMINE PRESENCE OR ABSENCE OF ANAEROBIC BACTERIA IN THIS CULTURE. FURTHER REPORT TO FOLLOW AFTER INCUBATION OF SUBCULTURE. MRSA SCREEN - PCR (NASAL) [[id number]] Collected:  02/17/17 1640    Order Status:  Completed Specimen:  Nasal from Nares Updated:  02/17/17 1908     Special Requests: NO SPECIAL REQUESTS        Culture result:         MRSA target DNA is not detected (presumptive not colonized with MRSA)              Day 5 of vancomycin. Goal trough is 12 - 18. Vancomycin dose continued at 1 gram IV every 12 hours. Will continue to follow patient.       Thank you,  Enrico Amaro PharmD, BCPS

## 2017-02-21 NOTE — PROGRESS NOTES
Shift assessment complete. Pt alert and oriented x4, respirations present, even and unlabored, HOB elevated, pt denies any SOB at this time, breath sounds diminished, S1&S2 auscultated, HR regular, abd soft, and tender, active BS in all 4 quadrants, pt is on bedrest ,pt has angeles cath draining yellow/straw urine to gravity, SCDs in place and functioning, saline lock in place and patent, No pressure ulcers, non-pitting edema noted, pt has scattered ecchymosis, pt has wound to buttocks that is packed with a dressing in place c/d/i, pt also has wounds to both heels with mipalex in place, pt denies any pain at this time, pt instructed to call for assistance, pt verbalizes understanding, bed low and locked, side rails x3, call light within reach.

## 2017-02-21 NOTE — PROGRESS NOTES
Hospitalist Progress Note    2017  Admit Date: 2017 12:30 PM   NAME: Mary Guerrero   :     MRN:  [id number]   Attending: Nory Cho MD  PCP:  Lg Mota MD  Treatment Team: Attending Provider: Nory Cho MD; Consulting Provider: Kavitha Yeager MD; Utilization Review: Jose Luis Freeman RN    DNR   SUBJECTIVE:     Ms. Sherice Garcia is a 65 yo female who presented with c/o painful, draining sacral decubitus ulcer. Pt is at [facility]. Pt had wound vac at [facility] but was removed 17. She reports she was told the wound was healing and doing better until the wound vac was taken off. Pt underwent surgical debridement 17. She was also found to have UTI on UA. No urine cx sent. Wound cx 17 showing light proteus mirabilis and light alpha streptococcus. 17 tissue cx showing light GNRs. Pt on azactam and Vanc. General Surgery following. Pt also found to have JORGE which has since resolved. Wound care following  Denies n/v/d, abd pain, CP.      Past Medical History   Diagnosis Date    Arthritis      generalized    Asthma      managed with inhalers    Atrial fibrillation ([address]      managed with medication and pacemaker    Chronic obstructive pulmonary disease [address]      managed with inhalers    Diabetes mellitus, insulin dependent (IDDM), uncontrolled- A1c 11.5 2012    Embolism and thrombosis 2016    Hx of blood clots      x 2 right arm    Hypertension      managed with medication     Morbid obesity (HCC)      BMI 49.7    Pacemaker      [place]    Peripheral neuropathy (HCC)      both feet and lower legs    Shortness of breath on exertion      managed with medication     Type 2 diabetes mellitus (HCC) Dx      insulin dependent/ Avg / low BS symptoms @ 60/ last A1C 7    Urinary frequency     Urinary incontinence      Recent Results (from the past 24 hour(s))   VANCOMYCIN, TROUGH    Collection Time: 17 10:19 AM   Result Value Ref Range    Vancomycin,trough 27.3 (HH) 5 - 20 ug/mL   GLUCOSE, POC    Collection Time: 02/20/17 11:45 AM   Result Value Ref Range    Glucose (POC) 237 (H) 65 - 100 mg/dL   GLUCOSE, POC    Collection Time: 02/20/17  6:22 PM   Result Value Ref Range    Glucose (POC) 155 (H) 65 - 100 mg/dL   GLUCOSE, POC    Collection Time: 02/20/17 11:36 PM   Result Value Ref Range    Glucose (POC) 191 (H) 65 - 100 mg/dL   GLUCOSE, POC    Collection Time: 02/21/17  6:01 AM   Result Value Ref Range    Glucose (POC) 222 (H) 65 - 100 mg/dL   CBC WITH AUTOMATED DIFF    Collection Time: 02/21/17  6:27 AM   Result Value Ref Range    WBC 7.8 4.3 - 11.1 K/uL    RBC 2.76 (L) 4.05 - 5.25 M/uL    HGB 8.2 (L) 11.7 - 15.4 g/dL    HCT 27.1 (L) 35.8 - 46.3 %    MCV 98.2 (H) 79.6 - 97.8 FL    MCH 29.7 26.1 - 32.9 PG    MCHC 30.3 (L) 31.4 - 35.0 g/dL    RDW 16.7 (H) 11.9 - 14.6 %    PLATELET 080 261 - 135 K/uL    MPV 10.4 (L) 10.8 - 14.1 FL    DF AUTOMATED      NEUTROPHILS 79 (H) 43 - 78 %    LYMPHOCYTES 13 13 - 44 %    MONOCYTES 6 4.0 - 12.0 %    EOSINOPHILS 1 0.5 - 7.8 %    BASOPHILS 0 0.0 - 2.0 %    IMMATURE GRANULOCYTES 1.2 0.0 - 5.0 %    ABS. NEUTROPHILS 6.1 1.7 - 8.2 K/UL    ABS. LYMPHOCYTES 1.0 0.5 - 4.6 K/UL    ABS. MONOCYTES 0.5 0.1 - 1.3 K/UL    ABS. EOSINOPHILS 0.1 0.0 - 0.8 K/UL    ABS. BASOPHILS 0.0 0.0 - 0.2 K/UL    ABS. IMM.  GRANS. 0.1 0.0 - 0.5 K/UL   METABOLIC PANEL, BASIC    Collection Time: 02/21/17  6:27 AM   Result Value Ref Range    Sodium 139 136 - 145 mmol/L    Potassium 4.4 3.5 - 5.1 mmol/L    Chloride 106 98 - 107 mmol/L    CO2 27 21 - 32 mmol/L    Anion gap 6 (L) 7 - 16 mmol/L    Glucose 185 (H) 65 - 100 mg/dL    BUN 21 8 - 23 MG/DL    Creatinine 0.83 0.6 - 1.0 MG/DL    GFR est AA >60 >60 ml/min/1.73m2    GFR est non-AA >60 >60 ml/min/1.73m2    Calcium 8.6 8.3 - 10.4 MG/DL     Allergies   Allergen Reactions    Penicillins Rash    Sulfa (Sulfonamide Antibiotics) Hives     Current Facility-Administered Medications Medication Dose Route Frequency Provider Last Rate Last Dose    vancomycin (VANCOCIN) 1,000 mg in 0.9% sodium chloride (MBP/ADV) 250 mL  1,000 mg IntraVENous Q12H Teresita Mckee NP        albuterol CONCENTRATE 2.5mg/0.5 mL neb soln  2.5 mg Nebulization Q6HWA RT Nguyen Camejo MD   2.5 mg at 02/21/17 0745    And    budesonide (PULMICORT) 500 mcg/2 ml nebulizer suspension  500 mcg Nebulization BID RT Nguyen Camejo MD   500 mcg at 02/21/17 0745    sodium chloride (NS) flush 5-10 mL  5-10 mL IntraVENous Q8H Rocio Sy MD   10 mL at 02/20/17 0603    sodium chloride (NS) flush 5-10 mL  5-10 mL IntraVENous PRN Rocio Sy MD   10 mL at 02/18/17 2154    amiodarone (CORDARONE) tablet 200 mg  200 mg Oral BID Nguyen Camejo MD   200 mg at 02/21/17 0856    ascorbic acid (vitamin C) (VITAMIN C) tablet 500 mg  500 mg Oral BID Nguyen Camejo MD   500 mg at 02/21/17 0856    aspirin delayed-release tablet 81 mg  81 mg Oral DAILY Nguyen Camejo MD   81 mg at 02/21/17 0856    docusate sodium (COLACE) capsule 100 mg  100 mg Oral BID Nguyen Camejo MD   100 mg at 02/21/17 0856    insulin glargine (LANTUS) injection 5 Units  5 Units SubCUTAneous QHS Nguyen Camejo MD   5 Units at 02/20/17 2200    losartan (COZAAR) tablet 25 mg  25 mg Oral DAILY Nguyen Camejo MD   25 mg at 02/21/17 0856    polyethylene glycol (MIRALAX) packet 17 g  17 g Oral BID Nguyen Camejo MD   17 g at 02/20/17 2203    pravastatin (PRAVACHOL) tablet 40 mg  40 mg Oral QHS Nguyen Camejo MD   40 mg at 02/20/17 2203    sodium chloride (NS) flush 5-10 mL  5-10 mL IntraVENous Q8H Nguyen Camejo MD   10 mL at 02/21/17 0604    sodium chloride (NS) flush 5-10 mL  5-10 mL IntraVENous PRN Nguyen Camejo MD        acetaminophen (TYLENOL) tablet 650 mg  650 mg Oral Q6H PRN Nguyen Camejo MD        HYDROcodone-acetaminophen (NORCO)  mg tablet 1 Tab 1 Tab Oral Q4H PRN Maldonado Butt MD   1 Tab at 17 0856    HYDROmorphone (PF) (DILAUDID) injection 1 mg  1 mg IntraVENous Q4H PRN Maldonado Butt MD   1 mg at 17 1814    naloxone [facility]) injection 0.4 mg  0.4 mg IntraVENous PRN Maldonado Butt MD        ondansetron [facility]) injection 4 mg  4 mg IntraVENous Q4H PRN Maldonado Butt MD   4 mg at 17 0747    aztreonam (AZACTAM) 1 g in 0.9% sodium chloride (MBP/ADV) 100 mL  1 g IntraVENous Marilu Johnson  mL/hr at 17 0855 1 g at 17 0855    Saccharomyces boulardii (FLORASTOR) capsule 500 mg  500 mg Oral BID Maldonado Butt MD   500 mg at 17 0855    hydrALAZINE (APRESOLINE) 20 mg/mL injection 10 mg  10 mg IntraVENous Q6H PRN Maldonado Butt MD        insulin lispro (HUMALOG) injection   SubCUTAneous Q6H Maldonado Butt MD   4 Units at 17 0600       Review of Systems negative with exception of pertinent positives noted above  PHYSICAL EXAM     Visit Vitals    /53 (BP 1 Location: Left arm, BP Patient Position: At rest)    Pulse 74    Temp 98.2 °F (36.8 °C)    Resp 18    Ht 5' 2\" (1.575 m)    Wt 122.9 kg (271 lb)    SpO2 96%    BMI 49.57 kg/m2      Temp (24hrs), Av.4 °F (36.3 °C), Min:96 °F (35.6 °C), Max:98.2 °F (36.8 °C)    Oxygen Therapy  O2 Sat (%): 96 % (17)  Pulse via Oximetry: 61 beats per minute (17)  O2 Device: Nasal cannula (17)  O2 Flow Rate (L/min): 2 l/min (17)  FIO2 (%): 28 % (17)    Intake/Output Summary (Last 24 hours) at 17  Last data filed at 17   Gross per 24 hour   Intake                0 ml   Output             1400 ml   Net            -1400 ml      General: No acute distress, obese    Lungs: CTA bilaterally. Resp even and nonlabored  Heart:  S1S2 present without murmurs rubs gallops. RRR. No edema  Abdomen: Soft, non tender, obese.  BS present  Extremities: Multiple toes bilateral feet amputated. Moves ext well  Neurologic:  No focal deficits  Psych: A/O X3  Skin:  Sacral decub with dressing dry/intact    Results summary of Diagnostic Studies/Procedures copied from within [facility] EMR:         [address] Problems    Diagnosis Date Noted    Sacral decubitus ulcer 02/17/2017    Atrial fibrillation ([facility] [address] 02/17/2017    Chronic anticoagulation 02/17/2017    Diabetes mellitus type 2 with complications ([facility] [address] [facility] 18/65/6931    COPD (chronic obstructive pulmonary disease) ([facility] [address] 02/17/2017    Hypertension 11/24/2016    Pacemaker DDD [facility] 01/07/2013    UTI (urinary tract infection) 12/29/2012     Plan:    Sacral decub, stage III: Surgery following, s/p debridement 2/19/2017. Wound care consulted. Continue Azactam and Vanc. Follow wound cx     UTI: no urine cx, continue azactam and Vancomyin, IVF     JORGE: resolved, follow BUN, Creatinine     Afib: holding xarelto may restart tomorrow, continue amiodarone     HTN: continue current regimen     DM II uncontrolled, hyperglycemia: BGL elevated. Diabetic diet.  A1C 7.9. continue SSI, increase lantus     DVT Prophylaxis: SCDs  Plan of Care Discussed with: Supervising MD  Dr. Blair Palma, care team, pt   Jasmeet Hinson NP

## 2017-02-21 NOTE — PROGRESS NOTES
Nutrition: Visited pt's room and discussed po intake and protein intake goals. Will order a nutrition supplement beverage secondary to poor appetite while admitted. Assessment notes to follow.     Ovidio Todd MS, [address], [address], DONALD  W: [phone]  C: [phone]

## 2017-02-21 NOTE — PROGRESS NOTES
Gae Goldberg, M.D. Colon and Rectal Surgeon  [facility]  [address], [address] [facility], [address]   Phone: [phone] Fax: Thresa Riedel  MRN: [id number]    HISTORY OF PRESENT ILLNESS:   Jenni Moncada is a 66y.o. year old female who I was asked to see regarding a sacral decubitus. The patient stated she has known about a sacral decubitus since she was at [facility] in November 2016. She never saw a wound MD there and was never sent to a wound center for this wound. When [employer] stopped paying,\" she was sent to [place]. At that facility, she had been seeing a wound physician intermittently. She states it was being treated with a VAC, but does not know the details of why that was removed. She stated that she has been told day after day that \"the wound looks good, healing well\" up until 2/17/17, when she was told it \"looks really bad\" and she has to go to the hospital.      Taken for debridement 2/19/17. She has moderate incisional pain, but is otherwise doing okay. Nausea/vomiting improved. She has been seen by wound care    REVIEW OF SYSTEMS:   Constitutional: No fevers/chills, no weakness, no fatigue, no lightheadedness, no night sweats, no insomnia, no appetite changes, no weight changes, no memory problems. Respiratory: No cough, +dyspnea, no wheezing, no hemoptysis, no sleep apnea   Cardiovascular: No chest pains, no chest pressure, no chest tightness, no palpitations   Gastrointestinal: No abdominal pains, no bowel habit changes, no nausea, no emesis, no hematochezia, no melena, no cramping, no constipation, no diarrhea, no incontinence, no jaundice, no diverticulosis. Otherwise per HPI     PHYSICAL EXAM:  Blood pressure 190/62, pulse 62, temperature 97.1 °F (36.2 °C), resp. rate 18, height 5' 2\" (1.575 m), weight 271 lb (122.9 kg), SpO2 97 %. Body mass index is 49.57 kg/(m^2).   The patient was evaluated in the presence of a medical assistant  General: Normotensive, in no acute distress, well developed, well nourished appearing   Chest:  Lungs clear, no rales, no rhonchi, no wheezes   Heart:  irreg, no murmurs, no rubs, no gallops   Abdomen:  Soft, obese, no tenderness, no rebound, no guarding, no masses, nondistended. Well healed lower midline, no hernia   Skin:  Unremarkable upper extremities. Left heel has a small eschar, no erythema, no drainage  Right heel has an unstageable ulcer with a leathery black eschar. No tenderness, no erythema, no drainge. Her sacral area was evaluated in the LLD position. Wound is much improved in appearance now, after debridement. No necrotic tissue at skin level. Wide open wound with clean, healthy, granulating bases. Minimal slough in base. No surrounding erythema. Recent Labs      02/21/17   0627  02/20/17   0538  02/19/17   0545   WBC  7.8  7.2  9.4   HGB  8.2*  7.8*  8.0*   HCT  27.1*  26.1*  26.3*   PLT  287  276  292       Recent Labs      02/21/17   0627  02/20/17   0538  02/19/17   0545   NA  139  144  145   K  4.4  4.5  4.5   CL  106  111*  109*   CO2  27  27  28   BUN  21  27*  38*   CREA  0.83  0.91  1.04*       UA positive 2/14/17 with large LE and 4+ bacteria  UA still positive 2/17/17 with large LE and 2+ bacteria, 20-50 WBC  A1c 7.9  CRP 4.7  Lactate 1.6  Blood cultures 2/17/17--negative x3 days  Wound culture 2/17/17--light proteus mirabalis and alpha strep  Wound culture 2/19/17--light GNRs and GPCs  Labs reviewed by me. ASSESSMENT:  Lalita sacral decubitus, stage III   S/p local debridement at bedside 2/17/17   S/p operative debridement of skin, SQ, and fascia 2/19/17  Bilateral heel wounds    RECOMMENDATIONS:  --aggressive offloading of sacrum. Side to side rolls, avoid laying on her back  --tight glucose control  --on vanc, cleocin, and aztreonam.  antibiotics per primary.   I think the leukocytosis is more likely from her UTI than this chronic wound  --continue dry gauze packing, as I am hoping that removing the gauze will hep continue the mechanical debridement  --ok to restart xarelto  --wound care consulted for their input  --offload heals, follow these areas conservatively  --will follow      Jam Sharp MD  2/21/2017      Elements of this note have been created with speech-recognition software. As a result, errors in speech recognition may have occurred.

## 2017-02-21 NOTE — PROGRESS NOTES
Problem: Interdisciplinary Rounds  Goal: Interdisciplinary Rounds  Interdisciplinary team rounds were held 2/21/2017 with the following team members:Care Management, Nursing, Nurse Practitioner, Nutrition, Pastoral Care, Pharmacy and Physician     Plan of care discussed. See clinical pathway and/or care plan for interventions and desired outcomes.

## 2017-02-22 NOTE — PROGRESS NOTES
Problem: Nutrition Deficit  Goal: *Optimize nutritional status  Nutrition Assessment for: MST BPA for pressure ulcer. Assessment: Pt admitted with problematic healing of a sacral decubitus ulcer. Presents from [facility]. Wound history in H&P. Spoke with pt who states \"you can tell my appetite has been fine\" when asked about po intake PTA (and laughs). Chart note of poorly controlled DM with insulin therapy. (POC glucose: 222, 191, 215, 237). Discussed pt's po intake while admitted. Pt admits poor appetite with present symptoms. Also notes concerns of losing her bed in [facility] where her  resides but then notes he was admitted to [facility] and \"has lost his bed too. \" Pt notes concerns for finding a better NH placement than [facility]. Reviewed pt's awareness of foods high in protein. Pt verbalized good understanding and states \"she had no idea how certain foods are needed\" for wound healing. Determined food prefs for protein foods to note in diet order. Wound Assessment: (per Dr. Jamarcus Norris) Wound is much improved in appearance now, after debridement. No necrotic tissue at skin level. Wide open wound with clean, healthy, granulating bases. Minimal slough in base. No surrounding erythema. Anthropometrics:  Height: 5' 2\" (1.575 m), Weight: 122.9 kg (271 lb), source not stated, BMI: 49.6 (Obesity Class III)     Macronutrient needs:  EER: 1720- 2213 kcal/day (14-18 kcal/kg actual BW)  MAX CHO: 225-275 gm/day (50% of kcal) or 60 gm/meal(3) with 30gm/snack(2-3)  EPR: 66-83 gm/day (1.2-1.5 gm/kg IBW 55kg)     Intake/Comparative Standards: Patient consuming possibly less <30% of Diabetic Consistent CHO diet per report. This meets ~30% of estimated kcal requirements and ~25% of estimated protein needs. Nutrition Diagnosis:  Inadequate oral intake related to decreased appetite with present symptoms as evidenced by pt's report of good appetite and po intake PTA.      Nutrition Intervention:  Meals and Snacks: Will provide nutritious meals according to diet order. Discussed food preferences (partuclarly protein sources) and noted on diet order. Commercial Beverage: Ordered Glucerna nutrition supplement with all trays. Each will potentially provide an additional 270 kcal and 10 gm protein. Coordination of Care: Spoke with Lisa with food services and confirmed pt's protein food preferences will be sent.      Enrico Martinez 87, [address] [place] [address], [address] [facility], 101 Dates

## 2017-02-22 NOTE — ADT AUTH CERT NOTES
H&P Notes          Interval H&P Note by Julia Hernandez MD at 02/19/17 0278 documented on ED to Hosp-Admission (Current) from 2/17/2017 in [address]        Author: Julia Hernandez MD Author Type: Physician Filed: 02/19/17 0832     Note Status: Signed Cosign: Cosign Not Required Date of Service: 02/19/17 0832     : Julia Hernandez MD (Physician)                 H&P Update:  Rolf Liao was seen and examined. History and physical has been reviewed. The patient has been examined. There have been no significant clinical changes since the completion of the originally dated History and Physical.     Signed By: Julia Hernandez MD      February 19, 2017 8:32 AM                          H&P (View-Only) by Julia Hernandez MD at 02/17/17 2249 documented on ED to Hosp-Admission (Current) from 2/17/2017 in [address]        Author: Julia Hernandez MD Author Type: Physician Filed: 02/17/17 2306     Note Status: Signed Cosign: Cosign Not Required Date of Service: 02/17/17 2245     : Julia Hernandez MD (Physician)   Ian Darling M.D. Colon and Rectal Surgeon  [address]  Phone: [phone] Fax: [phone]        Consult Note  2/17/2017     Rolf Liao  MRN: [id number]     Requesting Provider: Amy Yates     Primary Care Physician: Monty Chapa MD     Reason for Consult: decubitus     HISTORY OF PRESENT ILLNESS:  Rolf Liao is a 66y.o. year old female who I was asked to see regarding a sacral decubitus. The patient states she has known about a sacral decubitus since she was at [facility] in November 2016. She states she never saw a wound MD there and was never sent to a wound center for this wound. When [employer] stopped paying,\" she was sent to [facility]. At that facility, she has been seeing a wound physician intermittently.  She states it was being treated with a VAC, but does not know the details of why that was removed. She states that she has been told day after day that \"the wound looks good, healing well\" up until today, when she was told it \"looks really bad\" and she has to go to the hospital. She has had no fevers. She had nausea/vomiting tonight due to some medication she received. She describes lots of pain associated with the decub. She has poorly controlled DM. She has previously seen Dr Citlaly Fan in the wound center for heel wounds, and reviewing his notes, she had an exaggerated pain response then too.     REVIEW OF SYSTEMS:   Constitutional: No fevers/chills, no weakness, no fatigue, no lightheadedness, no night sweats, no insomnia, no appetite changes, no weight changes, no memory problems. Eyes: No changes in vision, no diplopia, no tearing, no scotomata, no pain   Ears/Nose/Throat/Mouth:  No change in hearing, no tinnitus, no bleeding, no epistaxis, no nasal discharge, no sinusitis. Respiratory: No cough, +dyspnea, no wheezing, no hemoptysis, no sleep apnea   Cardiovascular: No chest pains, no chest pressure, no chest tightness, no palpitations   Gastrointestinal: No abdominal pains, no bowel habit changes, no nausea, no emesis, no hematochezia, no melena, no cramping, no constipation, no diarrhea, no incontinence, no jaundice, no diverticulosis.  Otherwise per HPI   Genitourinary: No dysuria, no hematuria, no urinary frequency, no nocturia, no recent UTIs   Musculoskeletal: No back/neck pain, no arthritis, no joint pain/swelling, no muscle pains   Skin: No rashes, no itching, +ulcers   Hematologic:  No easy bruising, no anemia   Neurologic: No headaches, no dizziness, no seizures   Psychiatric: No depressive symptoms, no anxiety symptoms, no changes in mood, no substance abuse      PAST MEDICAL HISTORY: OA, asthma, afib, COD, DM2, h/o DVT x2, HTN, neuropathy, kidney stones     PAST SURGICAL HISTORY:  Bilateral heel spurs, hysterectomy, bilateral tennis elbow, right great and second toe amputation, left knee replacement, ORIF right femur, tonsillectomy/adenoids, bilateral Lasik     ALLERGIES: Sulfa, PCN     HOME MEDICATIONS:      Prior to Admission Medications   Prescriptions Last Dose Informant Patient Reported? Taking? B INFANTIS/B ANI/B SARAH/B BIFID (PROBIOTIC 4X PO)     Yes No   Sig: Take 1 Tab by mouth daily. HYDROcodone-acetaminophen (NORCO)  mg tablet     No No   Sig: Take 1 Tab by mouth three (3) times daily as needed for Pain. Max Daily Amount: 3 Tabs. HYDROcodone-acetaminophen (NORCO) 5-325 mg per tablet     No No   Sig: Take 1 Tab by mouth every eight (8) hours as needed for Pain. Max Daily Amount: 3 Tabs. amiodarone (PACERONE) 400 mg tablet     Yes No   Sig: Take 400 mg by mouth two (2) times a day. ascorbic acid, vitamin C, (VITAMIN C) 500 mg tablet     Yes No   Sig: Take 500 mg by mouth two (2) times a day. aspirin delayed-release 81 mg tablet     Yes No   Sig: Take 81 mg by mouth daily. benzonatate (TESSALON PERLES) 100 mg capsule     Yes No   Sig: Take 100 mg by mouth two (2) times daily as needed for Cough. budesonide-formoterol (SYMBICORT) 160-4.5 mcg/actuation HFA inhaler     Yes No   Sig: Take 2 Puffs by inhalation two (2) times a day. calcium-cholecalciferol, d3, (CALCIUM 600 + D) 600-125 mg-unit tab     Yes No   Sig: Take 1 Tab by mouth daily. cyanocobalamin (VITAMIN B-12) 2,500 mcg sublingual tablet     Yes No   Sig: Take 2,500 mcg by mouth every morning. docusate sodium (COLACE) 50 mg capsule     Yes No   Sig: Take 50 mg by mouth two (2) times a day. doxycycline (MONODOX) 100 mg capsule     Yes No   Sig: Take 100 mg by mouth two (2) times a day. fluticasone-vilanterol (BREO ELLIPTA) 100-25 mcg/dose inhaler     Yes No   Sig: Take 1 Puff by inhalation daily. furosemide (LASIX) 40 mg tablet     Yes No   Sig: Take 40 mg by mouth daily as needed.    insulin aspart (NOVOLOG FLEXPEN) 100 unit/mL inpn     Yes No   Si Units by SubCUTAneous route daily. insulin glargine (LANTUS) 100 unit/mL injection     No No   Si Units by SubCUTAneous route nightly. losartan (COZAAR) 25 mg tablet     Yes No   Sig: Take 25 mg by mouth every morning.   magnesium oxide (MAG-OX) 400 mg tablet     Yes No   Sig: Take 400 mg by mouth daily. multivitamin,tx-iron-ca-min (THERA-M) 27-0.4 mg tab     Yes No   Sig: Take 1 Tab by mouth daily. naproxen sodium (ANAPROX DS) 550 mg tablet     No No   Sig: Take 1 Tab by mouth two (2) times daily (with meals). polyethylene glycol (MIRALAX) 17 gram packet     Yes No   Sig: Take 17 g by mouth two (2) times a day. pravastatin (PRAVACHOL) 40 mg tablet     Yes No   Sig: Take 40 mg by mouth nightly. rivaroxaban (XARELTO) 20 mg tab tablet     No No   Sig: Take 1 Tab by mouth nightly. Indications: PREVENT THROMBOEMBOLISM IN CHRONIC ATRIAL FIBRILLATION       Facility-Administered Medications: None         SOCIAL HISTORY:  to her  of 40 years. She lives in [place]. She has five children, 14 grandchildren, and 7 great grandchildren. She worked in a [address] as a [employer] . Retired. Nonsmoker. No alcohol     PREVENTION: Never had a colonoscopy      FAMILY HISTORY: No colon or rectal cancer. No history of polyps. No breast, prostate, ovary, endometrial, gastric, or pancreatic cancers.     PHYSICAL EXAM:  Blood pressure 136/58, pulse 60, temperature 97.1 °F (36.2 °C), resp. rate 14, height 5' 2\" (1.575 m), weight 271 lb (122.9 kg), SpO2 99 %. Body mass index is 49.57 kg/(m^2).   The patient was evaluated in the presence of a medical assistant  General: Normotensive, in no acute distress, well developed, well nourished appearing   Head:  AT/NC, no lesions   Eyes:  PERRLA, EOM's full, conjunctivae clear   Neck:  Supple, no masses, no lymphadenopathy, no thyromegaly, no carotid bruits   Chest:  Lungs clear, no rales, no rhonchi, no wheezes   Heart:  irreg, no murmurs, no rubs, no gallops   Abdomen: Soft, obese, no tenderness, no rebound, no guarding, no masses, nondistended. Well healed lower midline, no hernia   Rectal:  Deferred   Back:  Normal curvature, no tenderness. Negative Roca's punch. Extremities:  FROM, no deformities, no edema, no erythema   Neuro:  Physiologic, oriented x3, full affect, no localizing findings   Skin:  Unremarkable upper extremities.      Left heel has a small eschar, no erythema, no drainage  Right heel has an unstageable ulcer with a leathery black eschar. No tenderness, no erythema, no drainge.     Her sacral area was evaluated in the LLD position. At the skin level, there are skin changes with bluish/purple discoloration around the upper aspect of the wound. The tissue is not necrotic, and bleeds and has sensation.      Inferiorly, there is a 2x2 cm open area of partial thickness skin breakdown. It is about 1 cm deep and there is a 1 cm wide skin bridge just above it. Above the skin bridge is a slightly larger open wound that is 2.5 x 2.5 cm in size. This one is much deeper, and is probably 3 cm deep. There is no exposed bone in the base of the wound. There is minimal superior and right lateral undermining. There is significant undermining in the left lateral/left upper direction of at least 6-8 cm. The base of the wound has necrotic, soupy slough.      Verbal consent was obtained. This subcutaneous tissue was sharply excisionally debrided with scissors. The tissue was dead and no anesthesia was given. Despite the tissue being necrotic and liquifying, the patient reported feeling pain associated with the procedure.  All visible necrotic tissue was removed, but exam was limited by poor lighting in the room.                Recent Labs      02/17/17  1402   WBC 11.9*   HGB 9.2*   HCT 29.5*                Recent Labs      02/17/17  1402      K 4.5      CO2 31   BUN 52*   CREA 1.38*   TBILI 0.4   SGOT 39*   ALT 45            UA positive 2/14/17 with large LE and 4+ bacteria  UA still positive 2/17/17 with large LE and 2+ bacteria, 20-50 WBC  A1c 7.9  CRP 4.7  Lactate 1.6  Blood cultures 2/17/17--pending  Wound culture 2/17/17--pending  Labs reviewed by me.     ASSESSMENT:  Lalita sacral decubitus, stage III  Bilateral heel wounds     RECOMMENDATIONS:  --bedside debridement tonight  --aggressive offloading. Side to side rolls, avoid laying on her back  --tight glucose control  --on vanc, cleocin, and aztreonam. antibiotics per primary. I think the leukocytosis is more likely from her UTI than this chronic wound  --Will start with dry gauze packing, as I am hoping that removing the gauze will hep continue the mechanical debridement  --ok to have diet for now, NPO on Sunday morning  --hold xarelto for now  --reevaluate wound and consider operative debridement Sunday, after Xarelto has worn off  --wound care consult for their input  --offload heals, follow these areas conservatively  --cultures have already been done and are pending.  --will follow        Norma Morales MD  2/17/2017        Elements of this note have been created with speech-recognition software. As a result, errors in speech recognition may have occurred.                         H&P by Ellis Gutierrez MD at 02/17/17 5193 documented on ED to Hosp-Admission (Current) from 2/17/2017 in [address]        Author: Ellis Gutierrez MD Author Type: Physician Filed: 02/17/17 6175     Note Status: Signed Cosign: Cosign Not Required Date of Service: 02/17/17 6138     : Ellis Gutierrez MD (Physician)              Expand All Collapse All         Subjective:      Patient: Sascha Clark MRN: [id number]  SSN: [id number]    YOB: 1945  Age: 66 y.o. Sex: female          HPI: Ms. Tanner Velasquez is a 65 yo WF with PMH of HTN, DM2, pAF, SSS with pacer on xarelto living at SNF evaluated with painful and draining unstageable sacral decubitus ulcer.  She has had this addressed with wound care and wound vac without improvement. She will need admit for surgical debridement and IV antibiotics.   Additionally has UTI     ROS positive for sinus congestion, dyspnea, edema and anorexia                     Past Medical History   Diagnosis Date    Arthritis         generalized    Asthma         managed with inhalers    Atrial fibrillation ([address]         managed with medication and pacemaker    Chronic obstructive pulmonary disease ([address]         managed with inhalers    Diabetes mellitus, insulin dependent (IDDM), uncontrolled- A1c 11.5 12/29/2012    Embolism and thrombosis 12/5/2016    Hx of blood clots         x 2 right arm    Hypertension         managed with medication     Morbid obesity ([address]         BMI 49.7    Pacemaker         [place]    Peripheral neuropathy (HCC)         both feet and lower legs    Shortness of breath on exertion         managed with medication     Type 2 diabetes mellitus ([address] Dx 1991       insulin dependent/ Avg / low BS symptoms @ 58/ last A1C 7    Urinary frequency      Urinary incontinence               Past Surgical History   Procedure Laterality Date    Pr removal of heel spur Bilateral      Hx hysterectomy        Vascular surgery procedure unlist Right 12/28/2012       removed from arm    Hx orthopaedic Bilateral         tennis elbow surgery    Hx amputation Right         Great toe and 2nd toe    Hx knee replacement Left      Hx refractive surgery Bilateral      Hx tonsil and adenoidectomy             (Not in a hospital admission)         Current Facility-Administered Medications   Medication Dose Route Frequency    sodium chloride (NS) flush 5-10 mL 5-10 mL IntraVENous Q8H    sodium chloride (NS) flush 5-10 mL 5-10 mL IntraVENous PRN    vancomycin (VANCOCIN) 1,000 mg in 0.9% sodium chloride (MBP/ADV) 250 mL 1,000 mg IntraVENous NOW    vancomycin (VANCOCIN) 1,000 mg in 0.9% sodium chloride (MBP/ADV) 250 mL 1,000 mg IntraVENous ONCE    budesonide (PULMICORT) 500 mcg/2 ml nebulizer suspension 500 mcg Nebulization BID RT    aztreonam (AZACTAM) 1 g in 0.9% sodium chloride (MBP/ADV) 100 mL 1 g IntraVENous Q8H    tuberculin injection 5 Units 5 Units IntraDERMal ONCE    Saccharomyces boulardii (FLORASTOR) capsule 500 mg 500 mg Oral BID    hydrALAZINE (APRESOLINE) 20 mg/mL injection 10 mg 10 mg IntraVENous Q6H PRN    0.9% sodium chloride infusion 75 mL/hr IntraVENous CONTINUOUS           Current Outpatient Prescriptions   Medication Sig    HYDROcodone-acetaminophen (NORCO) 5-325 mg per tablet Take 1 Tab by mouth every eight (8) hours as needed for Pain. Max Daily Amount: 3 Tabs.  naproxen sodium (ANAPROX DS) 550 mg tablet Take 1 Tab by mouth two (2) times daily (with meals).  fluticasone-vilanterol (BREO ELLIPTA) 100-25 mcg/dose inhaler Take 1 Puff by inhalation daily.  calcium-cholecalciferol, d3, (CALCIUM 600 + D) 600-125 mg-unit tab Take 1 Tab by mouth daily.  magnesium oxide (MAG-OX) 400 mg tablet Take 400 mg by mouth daily.  insulin aspart (NOVOLOG FLEXPEN) 100 unit/mL inpn 6 Units by SubCUTAneous route daily.  B INFANTIS/B ANI/B SARAH/B BIFID (PROBIOTIC 4X PO) Take 1 Tab by mouth daily.  multivitamin,tx-iron-ca-min (THERA-M) 27-0.4 mg tab Take 1 Tab by mouth daily.  amiodarone (PACERONE) 400 mg tablet Take 400 mg by mouth two (2) times a day.  doxycycline (MONODOX) 100 mg capsule Take 100 mg by mouth two (2) times a day.  ascorbic acid, vitamin C, (VITAMIN C) 500 mg tablet Take 500 mg by mouth two (2) times a day.  benzonatate (TESSALON PERLES) 100 mg capsule Take 100 mg by mouth two (2) times daily as needed for Cough.  rivaroxaban (XARELTO) 20 mg tab tablet Take 1 Tab by mouth nightly. Indications: PREVENT THROMBOEMBOLISM IN CHRONIC ATRIAL FIBRILLATION    aspirin delayed-release 81 mg tablet Take 81 mg by mouth daily.     HYDROcodone-acetaminophen (NORCO)  mg tablet Take 1 Tab by mouth three (3) times daily as needed for Pain. Max Daily Amount: 3 Tabs.  insulin glargine (LANTUS) 100 unit/mL injection 5 Units by SubCUTAneous route nightly.  budesonide-formoterol (SYMBICORT) 160-4.5 mcg/actuation HFA inhaler Take 2 Puffs by inhalation two (2) times a day.  docusate sodium (COLACE) 50 mg capsule Take 50 mg by mouth two (2) times a day.  losartan (COZAAR) 25 mg tablet Take 25 mg by mouth every morning.  cyanocobalamin (VITAMIN B-12) 2,500 mcg sublingual tablet Take 2,500 mcg by mouth every morning.  polyethylene glycol (MIRALAX) 17 gram packet Take 17 g by mouth two (2) times a day.  pravastatin (PRAVACHOL) 40 mg tablet Take 40 mg by mouth nightly.     furosemide (LASIX) 40 mg tablet Take 40 mg by mouth daily as needed.           Allergies   Allergen Reactions    Penicillins Rash    Sulfa (Sulfonamide Antibiotics) Hives             Social History   Substance Use Topics    Smoking status: Former Smoker       Years: 2.00    Smokeless tobacco: Never Used         Comment: social smoking during teenage years   [facility] Alcohol use No            Family History   Problem Relation Age of Onset    Heart Disease Mother      Diabetes Father           Review of Systems  Complete review of systems was obtained and is otherwise negative unless stated in the HPI         I have reviewed all the pertinent medical and surgical history, social history, allergies, family history, as well as pertinent labs and studies .        Objective:      Patient Vitals for the past 24 hrs:    BP Temp Pulse Resp SpO2 Height Weight   02/17/17 1421 - - - - 96 % - -   02/17/17 1233 153/47 98.1 °F (36.7 °C) 60 16 94 % 5' 2\" (1.575 m) 122.9 kg (271 lb)   02/17/17 1231 - - - - 94 % - -               Exam:  General: well developed, well nourished, no distress, alert, obese  Eyes: PERRLA  HEENT: oral cavity clear,nasal septum midline  Neck: supple, symmetrical, trachea midline, no adenopathy,no carotid bruit and no JVD  Lungs: clear to auscultation bilaterally, good effort   Heart: regular rate and rhythm, S1, S2 normal, no murmur, click, rub or gallop, 2+ edema   Abdomen: soft, non-tender. Bowel sounds normal. No masses, no organomegaly, obese  Extremities: extremities normal, atraumatic  Skin: unstageable foul smelling and draining sacral decubitus ulcer   Neurologic: Alert and oriented X 3, normal strength and tone. Musculoskeletal: no gross deficits   Psychiatric: appropriate mood and affect     ECG:      Data Review (Labs):    Recent Results          Recent Results (from the past 24 hour(s))   C REACTIVE PROTEIN, QT     Collection Time: 02/17/17 2:02 PM   Result Value Ref Range     C-Reactive protein 4.7 (H) 0.0 - 0.9 mg/dL   METABOLIC PANEL, COMPREHENSIVE     Collection Time: 02/17/17 2:02 PM   Result Value Ref Range     Sodium 139 136 - 145 mmol/L     Potassium 4.5 3.5 - 5.1 mmol/L     Chloride 104 98 - 107 mmol/L     CO2 31 21 - 32 mmol/L     Anion gap 4 (L) 7 - 16 mmol/L     Glucose 217 (H) 65 - 100 mg/dL     BUN 52 (H) 8 - 23 MG/DL     Creatinine 1.38 (H) 0.6 - 1.0 MG/DL     GFR est AA 48 (L) >60 ml/min/1.73m2     GFR est non-AA 40 (L) >60 ml/min/1.73m2     Calcium 8.8 8.3 - 10.4 MG/DL     Bilirubin, total 0.4 0.2 - 1.1 MG/DL     ALT (SGPT) 45 12 - 65 U/L     AST (SGOT) 39 (H) 15 - 37 U/L     Alk.  phosphatase 133 50 - 136 U/L     Protein, total 7.2 6.3 - 8.2 g/dL     Albumin 2.5 (L) 3.2 - 4.6 g/dL     Globulin 4.7 (H) 2.3 - 3.5 g/dL     A-G Ratio 0.5 (L) 1.2 - 3.5    CBC WITH AUTOMATED DIFF     Collection Time: 02/17/17 2:02 PM   Result Value Ref Range     WBC 11.9 (H) 4.3 - 11.1 K/uL     RBC 3.07 (L) 4.05 - 5.25 M/uL     HGB 9.2 (L) 11.7 - 15.4 g/dL     HCT 29.5 (L) 35.8 - 46.3 %     MCV 96.1 79.6 - 97.8 FL     MCH 30.0 26.1 - 32.9 PG     MCHC 31.2 (L) 31.4 - 35.0 g/dL     RDW 16.8 (H) 11.9 - 14.6 %     PLATELET 309 216 - 219 K/uL     MPV 10.5 (L) 10.8 - 14.1 FL     DF AUTOMATED        NEUTROPHILS 77 43 - 78 %     LYMPHOCYTES 13 13 - 44 %     MONOCYTES 9 4.0 - 12.0 %     EOSINOPHILS 1 0.5 - 7.8 %     BASOPHILS 0 0.0 - 2.0 %     IMMATURE GRANULOCYTES 0.3 0.0 - 5.0 %     ABS. NEUTROPHILS 9.2 (H) 1.7 - 8.2 K/UL     ABS. LYMPHOCYTES 1.5 0.5 - 4.6 K/UL     ABS. MONOCYTES 1.0 0.1 - 1.3 K/UL     ABS. EOSINOPHILS 0.1 0.0 - 0.8 K/UL     ABS. BASOPHILS 0.0 0.0 - 0.2 K/UL     ABS. IMM.  GRANS. 0.0 0.0 - 0.5 K/UL   POC LACTIC ACID     Collection Time: 02/17/17 2:03 PM   Result Value Ref Range     Lactic Acid (POC) 1.6 0.5 - 1.9 mmol/L            Assessment / Plan:   Principal Problem:  Sacral decubitus ulcer (2/17/2017)     Active Problems:  UTI (urinary tract infection) (12/29/2012)     Pacemaker DDD [place] (1/7/2013)     Hypertension (11/24/2016)     Atrial fibrillation (HCC) (2/17/2017)     Chronic anticoagulation (2/17/2017)     Diabetes mellitus type 2 with complications ([facility] (6/76/7166)     COPD (chronic obstructive pulmonary disease) ([facility] (2/17/2017)              -admit to medical bed   -NPO for surgery consult   -has PCN allergy, added azactam and continue vancomycin   -wound care consult   -home meds as ordered, holding xarelto for surgery consult   -CXR due to dyspnea, continue nebulizers  -recheck UA with c/s   -will need SW for dispo   DVT Prophylaxis:SCD  FEN:NPO,IVF  Code Status: DNR  PMD: [address] addressed:daughter charlotte [phone]  EST LOS 3-5 days  Dominga Alvarado MD     Signed By: Dominga Alvarado MD      February 17, 2017

## 2017-02-22 NOTE — PROGRESS NOTES
Problem: Interdisciplinary Rounds  Goal: Interdisciplinary Rounds  Interdisciplinary team rounds were held 2/22/2017 with the following team members:Care Management, Nursing, Nurse Practitioner, Nutrition, Pharmacy and Physician      Plan of care discussed. See clinical pathway and/or care plan for interventions and desired outcomes.

## 2017-02-22 NOTE — PROGRESS NOTES
Problem: Nutrition Deficit  Goal: *Optimize nutritional status  Nutrition Follow Up:    Current Diet:  Consistent CHO with Glucerna Shake supplement 3 X day. Pt endorses she drank 2 glucerna shake supplement today; po intake fair r/t decreased appetite and states stomach upset this evening, + emesis past supper meal.   Wound Assessment: (per Dr. Ava Hinton) Wound is much improved in appearance now, after debridement. No necrotic tissue at skin level. Wide open wound with clean, healthy, granulating bases. Minimal slough in base. No surrounding erythema. Pt followed by wound RN. Pertinent Labs:  POC glucose range today:  200, 215, 147 mg/dl    Macronutrient needs:  EER: 1720- 2213 kcal/day (14-18 kcal/kg actual BW)  MAX CHO: 225-275 gm/day (50% of kcal)   EPR: 66-83 gm/day (1.2-1.5 gm/kg IBW 55kg)    Intake/Comparative Standards: No recorded intake; patient states she did not eat lunch-no appetite; ate ~ 60% supper meal + 2 Glucerna Shake supplements. Patient potentially meets ~ 65% estimated kcal and protein needs. Nutrition Intervention:  Meals and Snacks: Continue current diet  Encouraged po intake  Nutrition Supplement:  Continue Glucerna; change to 2 X day   Coordination of Care: Interdisciplinary Rounds  Monitoring / Evaluation:  % intake, acceptance of Glucerna Shake supplement.   Dorina Jimenes, [address], [address] [place] [address], MPH  [phone]

## 2017-02-22 NOTE — ADT AUTH CERT NOTES
H&P Update: [id number]          Joan Strickland was seen and examined. History and physical has been reviewed. The patient has been examined. There have been no significant clinical changes since the completion of the originally dated History and Physical.     Signed By: Micah Sanchez MD      February 19, 2017 8:32 AM                          H&P (View-Only) by Micah Sanchez MD at 02/17/17 2245 documented on ED to Hosp-Admission (Current) from 2/17/2017 in [address]        Author: Micah Sanchez MD Author Type: Physician Filed: 02/17/17 4614     Note Status: Signed Cosign: Cosign Not Required Date of Service: 02/17/17 2245     : Micah Sanchez MD (Physician)   Alea Baker M.D. Colon and Rectal Surgeon  [address]  Phone: [phone] Fax: [phone]        Consult Note  2/17/2017     Joan Strickland  MRN: [id number]     Requesting Provider: Clifton Trejo     Primary Care Physician: Raya Parkinson MD     Reason for Consult: decubitus     HISTORY OF PRESENT ILLNESS:  Joan Strickland is a 66y.o. year old female who I was asked to see regarding a sacral decubitus. The patient states she has known about a sacral decubitus since she was at [facility] in November 2016. She states she never saw a wound MD there and was never sent to a wound center for this wound. When [employer] stopped paying,\" she was sent to [facility]. At that facility, she has been seeing a wound physician intermittently. She states it was being treated with a VAC, but does not know the details of why that was removed. She states that she has been told day after day that \"the wound looks good, healing well\" up until today, when she was told it \"looks really bad\" and she has to go to the hospital. She has had no fevers. She had nausea/vomiting tonight due to some medication she received.  She describes lots of pain associated with the decub. She has poorly controlled DM. She has previously seen Dr Leeroy Lema in the wound center for heel wounds, and reviewing his notes, she had an exaggerated pain response then too.     REVIEW OF SYSTEMS:   Constitutional: No fevers/chills, no weakness, no fatigue, no lightheadedness, no night sweats, no insomnia, no appetite changes, no weight changes, no memory problems. Eyes: No changes in vision, no diplopia, no tearing, no scotomata, no pain   Ears/Nose/Throat/Mouth:  No change in hearing, no tinnitus, no bleeding, no epistaxis, no nasal discharge, no sinusitis. Respiratory: No cough, +dyspnea, no wheezing, no hemoptysis, no sleep apnea   Cardiovascular: No chest pains, no chest pressure, no chest tightness, no palpitations   Gastrointestinal: No abdominal pains, no bowel habit changes, no nausea, no emesis, no hematochezia, no melena, no cramping, no constipation, no diarrhea, no incontinence, no jaundice, no diverticulosis. Otherwise per HPI   Genitourinary: No dysuria, no hematuria, no urinary frequency, no nocturia, no recent UTIs   Musculoskeletal: No back/neck pain, no arthritis, no joint pain/swelling, no muscle pains   Skin: No rashes, no itching, +ulcers   Hematologic:  No easy bruising, no anemia   Neurologic: No headaches, no dizziness, no seizures   Psychiatric: No depressive symptoms, no anxiety symptoms, no changes in mood, no substance abuse      PAST MEDICAL HISTORY: OA, asthma, afib, COD, DM2, h/o DVT x2, HTN, neuropathy, kidney stones     PAST SURGICAL HISTORY:  Bilateral heel spurs, hysterectomy, bilateral tennis elbow, right great and second toe amputation, left knee replacement, ORIF right femur, tonsillectomy/adenoids, bilateral Lasik     ALLERGIES: Sulfa, PCN     HOME MEDICATIONS:      Prior to Admission Medications   Prescriptions Last Dose Informant Patient Reported? Taking?    B INFANTIS/B ANI/B SARAH/B BIFID (PROBIOTIC 4X PO)     Yes No   Sig: Take 1 Tab by mouth daily.   HYDROcodone-acetaminophen (NORCO)  mg tablet     No No   Sig: Take 1 Tab by mouth three (3) times daily as needed for Pain. Max Daily Amount: 3 Tabs. HYDROcodone-acetaminophen (NORCO) 5-325 mg per tablet     No No   Sig: Take 1 Tab by mouth every eight (8) hours as needed for Pain. Max Daily Amount: 3 Tabs. amiodarone (PACERONE) 400 mg tablet     Yes No   Sig: Take 400 mg by mouth two (2) times a day. ascorbic acid, vitamin C, (VITAMIN C) 500 mg tablet     Yes No   Sig: Take 500 mg by mouth two (2) times a day. aspirin delayed-release 81 mg tablet     Yes No   Sig: Take 81 mg by mouth daily. benzonatate (TESSALON PERLES) 100 mg capsule     Yes No   Sig: Take 100 mg by mouth two (2) times daily as needed for Cough. budesonide-formoterol (SYMBICORT) 160-4.5 mcg/actuation HFA inhaler     Yes No   Sig: Take 2 Puffs by inhalation two (2) times a day. calcium-cholecalciferol, d3, (CALCIUM 600 + D) 600-125 mg-unit tab     Yes No   Sig: Take 1 Tab by mouth daily. cyanocobalamin (VITAMIN B-12) 2,500 mcg sublingual tablet     Yes No   Sig: Take 2,500 mcg by mouth every morning. docusate sodium (COLACE) 50 mg capsule     Yes No   Sig: Take 50 mg by mouth two (2) times a day. doxycycline (MONODOX) 100 mg capsule     Yes No   Sig: Take 100 mg by mouth two (2) times a day. fluticasone-vilanterol (BREO ELLIPTA) 100-25 mcg/dose inhaler     Yes No   Sig: Take 1 Puff by inhalation daily. furosemide (LASIX) 40 mg tablet     Yes No   Sig: Take 40 mg by mouth daily as needed. insulin aspart (NOVOLOG FLEXPEN) 100 unit/mL inpn     Yes No   Si Units by SubCUTAneous route daily. insulin glargine (LANTUS) 100 unit/mL injection     No No   Si Units by SubCUTAneous route nightly. losartan (COZAAR) 25 mg tablet     Yes No   Sig: Take 25 mg by mouth every morning.   magnesium oxide (MAG-OX) 400 mg tablet     Yes No   Sig: Take 400 mg by mouth daily.    multivitamin,tx-iron-ca-min (THERA-M) 27-0.4 mg tab     Yes No   Sig: Take 1 Tab by mouth daily. naproxen sodium (ANAPROX DS) 550 mg tablet     No No   Sig: Take 1 Tab by mouth two (2) times daily (with meals). polyethylene glycol (MIRALAX) 17 gram packet     Yes No   Sig: Take 17 g by mouth two (2) times a day. pravastatin (PRAVACHOL) 40 mg tablet     Yes No   Sig: Take 40 mg by mouth nightly. rivaroxaban (XARELTO) 20 mg tab tablet     No No   Sig: Take 1 Tab by mouth nightly. Indications: PREVENT THROMBOEMBOLISM IN CHRONIC ATRIAL FIBRILLATION       Facility-Administered Medications: None         SOCIAL HISTORY:  to her  of 40 years. She lives in [facility]. She has five children, 14 grandchildren, and 7 great grandchildren. She worked in a [employer] [employer] as a [employer] . Retired. Nonsmoker. No alcohol     PREVENTION: Never had a colonoscopy      FAMILY HISTORY: No colon or rectal cancer. No history of polyps. No breast, prostate, ovary, endometrial, gastric, or pancreatic cancers.     PHYSICAL EXAM:  Blood pressure 136/58, pulse 60, temperature 97.1 °F (36.2 °C), resp. rate 14, height 5' 2\" (1.575 m), weight 271 lb (122.9 kg), SpO2 99 %. Body mass index is 49.57 kg/(m^2). The patient was evaluated in the presence of a medical assistant  General: Normotensive, in no acute distress, well developed, well nourished appearing   Head:  AT/NC, no lesions   Eyes:  PERRLA, EOM's full, conjunctivae clear   Neck:  Supple, no masses, no lymphadenopathy, no thyromegaly, no carotid bruits   Chest:  Lungs clear, no rales, no rhonchi, no wheezes   Heart:  irreg, no murmurs, no rubs, no gallops   Abdomen:  Soft, obese, no tenderness, no rebound, no guarding, no masses, nondistended. Well healed lower midline, no hernia   Rectal:  Deferred   Back:  Normal curvature, no tenderness. Negative Roca's punch.    Extremities:  FROM, no deformities, no edema, no erythema   Neuro:  Physiologic, oriented x3, full affect, no localizing findings   Skin: Unremarkable upper extremities.      Left heel has a small eschar, no erythema, no drainage  Right heel has an unstageable ulcer with a leathery black eschar. No tenderness, no erythema, no drainge.     Her sacral area was evaluated in the LLD position. At the skin level, there are skin changes with bluish/purple discoloration around the upper aspect of the wound. The tissue is not necrotic, and bleeds and has sensation.      Inferiorly, there is a 2x2 cm open area of partial thickness skin breakdown. It is about 1 cm deep and there is a 1 cm wide skin bridge just above it. Above the skin bridge is a slightly larger open wound that is 2.5 x 2.5 cm in size. This one is much deeper, and is probably 3 cm deep. There is no exposed bone in the base of the wound. There is minimal superior and right lateral undermining. There is significant undermining in the left lateral/left upper direction of at least 6-8 cm. The base of the wound has necrotic, soupy slough.      Verbal consent was obtained. This subcutaneous tissue was sharply excisionally debrided with scissors. The tissue was dead and no anesthesia was given. Despite the tissue being necrotic and liquifying, the patient reported feeling pain associated with the procedure.  All visible necrotic tissue was removed, but exam was limited by poor lighting in the room.                Recent Labs      02/17/17  1402   WBC 11.9*   HGB 9.2*   HCT 29.5*                Recent Labs      02/17/17  1402      K 4.5      CO2 31   BUN 52*   CREA 1.38*   TBILI 0.4   SGOT 39*   ALT 45            UA positive 2/14/17 with large LE and 4+ bacteria  UA still positive 2/17/17 with large LE and 2+ bacteria, 20-50 WBC  A1c 7.9  CRP 4.7  Lactate 1.6  Blood cultures 2/17/17--pending  Wound culture 2/17/17--pending  Labs reviewed by me.     ASSESSMENT:  Lalita sacral decubitus, stage III  Bilateral heel wounds     RECOMMENDATIONS:  --bedside debridement tonight  --aggressive offloading. Side to side rolls, avoid laying on her back  --tight glucose control  --on vanc, cleocin, and aztreonam. antibiotics per primary. I think the leukocytosis is more likely from her UTI than this chronic wound  --Will start with dry gauze packing, as I am hoping that removing the gauze will hep continue the mechanical debridement  --ok to have diet for now, NPO on Sunday morning  --hold xarelto for now  --reevaluate wound and consider operative debridement Sunday, after Xarelto has worn off  --wound care consult for their input  --offload heals, follow these areas conservatively  --cultures have already been done and are pending.  --will follow        Micah Sanchez MD  2/17/2017        Elements of this note have been created with speech-recognition software. As a result, errors in speech recognition may have occurred.                         H&P by Ruddy Elizondo MD at 02/17/17 3623 documented on ED to Hosp-Admission (Current) from 2/17/2017 in [address]        Author: Ruddy Elizondo MD Author Type: Physician Filed: 02/17/17 6298     Note Status: Signed Cosign: Cosign Not Required Date of Service: 02/17/17 1531     : Ruddy Elizondo MD (Physician)              Expand All Collapse All         Subjective:      Patient: Joan Strickland MRN: [id number]  SSN: [id number]    YOB: 1945  Age: 66 y.o. Sex: female          HPI: Ms. Zuleyka Nichols is a 65 yo WF with PMH of HTN, DM2, pAF, SSS with pacer on xarelto living at SNF evaluated with painful and draining unstageable sacral decubitus ulcer. She has had this addressed with wound care and wound vac without improvement. She will need admit for surgical debridement and IV antibiotics.   Additionally has UTI     ROS positive for sinus congestion, dyspnea, edema and anorexia                     Past Medical History   Diagnosis Date    Arthritis         generalized    Asthma         managed with inhalers    Atrial fibrillation ([facility]         managed with medication and pacemaker    Chronic obstructive pulmonary disease ([facility]         managed with inhalers    Diabetes mellitus, insulin dependent (IDDM), uncontrolled- A1c 11.5 12/29/2012    Embolism and thrombosis 12/5/2016    Hx of blood clots         x 2 right arm    Hypertension         managed with medication     Morbid obesity ([facility]         BMI 49.7    Pacemaker         [place]    Peripheral neuropathy (HCC)         both feet and lower legs    Shortness of breath on exertion         managed with medication     Type 2 diabetes mellitus (HCC) Dx 1991       insulin dependent/ Avg / low BS symptoms @ 58/ last A1C 7    Urinary frequency      Urinary incontinence               Past Surgical History   Procedure Laterality Date    Pr removal of heel spur Bilateral      Hx hysterectomy        Vascular surgery procedure unlist Right 12/28/2012       removed from arm    Hx orthopaedic Bilateral         tennis elbow surgery    Hx amputation Right         Great toe and 2nd toe    Hx knee replacement Left      Hx refractive surgery Bilateral      Hx tonsil and adenoidectomy             (Not in a hospital admission)         Current Facility-Administered Medications   Medication Dose Route Frequency    sodium chloride (NS) flush 5-10 mL 5-10 mL IntraVENous Q8H    sodium chloride (NS) flush 5-10 mL 5-10 mL IntraVENous PRN    vancomycin (VANCOCIN) 1,000 mg in 0.9% sodium chloride (MBP/ADV) 250 mL 1,000 mg IntraVENous NOW    vancomycin (VANCOCIN) 1,000 mg in 0.9% sodium chloride (MBP/ADV) 250 mL 1,000 mg IntraVENous ONCE    budesonide (PULMICORT) 500 mcg/2 ml nebulizer suspension 500 mcg Nebulization BID RT    aztreonam (AZACTAM) 1 g in 0.9% sodium chloride (MBP/ADV) 100 mL 1 g IntraVENous Q8H    tuberculin injection 5 Units 5 Units IntraDERMal ONCE    Saccharomyces boulardii (FLORASTOR) capsule 500 mg 500 mg Oral BID    hydrALAZINE (APRESOLINE) 20 mg/mL injection 10 mg 10 mg IntraVENous Q6H PRN    0.9% sodium chloride infusion 75 mL/hr IntraVENous CONTINUOUS           Current Outpatient Prescriptions   Medication Sig    HYDROcodone-acetaminophen (NORCO) 5-325 mg per tablet Take 1 Tab by mouth every eight (8) hours as needed for Pain. Max Daily Amount: 3 Tabs.  naproxen sodium (ANAPROX DS) 550 mg tablet Take 1 Tab by mouth two (2) times daily (with meals).  fluticasone-vilanterol (BREO ELLIPTA) 100-25 mcg/dose inhaler Take 1 Puff by inhalation daily.  calcium-cholecalciferol, d3, (CALCIUM 600 + D) 600-125 mg-unit tab Take 1 Tab by mouth daily.  magnesium oxide (MAG-OX) 400 mg tablet Take 400 mg by mouth daily.  insulin aspart (NOVOLOG FLEXPEN) 100 unit/mL inpn 6 Units by SubCUTAneous route daily.  B INFANTIS/B ANI/B SARAH/B BIFID (PROBIOTIC 4X PO) Take 1 Tab by mouth daily.  multivitamin,tx-iron-ca-min (THERA-M) 27-0.4 mg tab Take 1 Tab by mouth daily.  amiodarone (PACERONE) 400 mg tablet Take 400 mg by mouth two (2) times a day.  doxycycline (MONODOX) 100 mg capsule Take 100 mg by mouth two (2) times a day.  ascorbic acid, vitamin C, (VITAMIN C) 500 mg tablet Take 500 mg by mouth two (2) times a day.  benzonatate (TESSALON PERLES) 100 mg capsule Take 100 mg by mouth two (2) times daily as needed for Cough.  rivaroxaban (XARELTO) 20 mg tab tablet Take 1 Tab by mouth nightly. Indications: PREVENT THROMBOEMBOLISM IN CHRONIC ATRIAL FIBRILLATION    aspirin delayed-release 81 mg tablet Take 81 mg by mouth daily.  HYDROcodone-acetaminophen (NORCO)  mg tablet Take 1 Tab by mouth three (3) times daily as needed for Pain. Max Daily Amount: 3 Tabs.  insulin glargine (LANTUS) 100 unit/mL injection 5 Units by SubCUTAneous route nightly.  budesonide-formoterol (SYMBICORT) 160-4.5 mcg/actuation HFA inhaler Take 2 Puffs by inhalation two (2) times a day.     docusate sodium (COLACE) 50 mg capsule Take 50 mg by mouth two (2) times a day.  losartan (COZAAR) 25 mg tablet Take 25 mg by mouth every morning.  cyanocobalamin (VITAMIN B-12) 2,500 mcg sublingual tablet Take 2,500 mcg by mouth every morning.  polyethylene glycol (MIRALAX) 17 gram packet Take 17 g by mouth two (2) times a day.  pravastatin (PRAVACHOL) 40 mg tablet Take 40 mg by mouth nightly.  furosemide (LASIX) 40 mg tablet Take 40 mg by mouth daily as needed.           Allergies   Allergen Reactions    Penicillins Rash    Sulfa (Sulfonamide Antibiotics) Hives             Social History   Substance Use Topics    Smoking status: Former Smoker       Years: 2.00    Smokeless tobacco: Never Used         Comment: social smoking during teenage years   Anna Dicks Alcohol use No            Family History   Problem Relation Age of Onset    Heart Disease Mother      Diabetes Father           Review of Systems  Complete review of systems was obtained and is otherwise negative unless stated in the HPI         I have reviewed all the pertinent medical and surgical history, social history, allergies, family history, as well as pertinent labs and studies .        Objective:      Patient Vitals for the past 24 hrs:    BP Temp Pulse Resp SpO2 Height Weight   02/17/17 1421 - - - - 96 % - -   02/17/17 1233 153/47 98.1 °F (36.7 °C) 60 16 94 % 5' 2\" (1.575 m) 122.9 kg (271 lb)   02/17/17 1231 - - - - 94 % - -               Exam:  General: well developed, well nourished, no distress, alert, obese  Eyes: PERRLA  HEENT: oral cavity clear,nasal septum midline  Neck: supple, symmetrical, trachea midline, no adenopathy,no carotid bruit and no JVD  Lungs: clear to auscultation bilaterally, good effort   Heart: regular rate and rhythm, S1, S2 normal, no murmur, click, rub or gallop, 2+ edema   Abdomen: soft, non-tender.  Bowel sounds normal. No masses, no organomegaly, obese  Extremities: extremities normal, atraumatic  Skin: unstageable foul smelling and draining sacral decubitus ulcer Neurologic: Alert and oriented X 3, normal strength and tone. Musculoskeletal: no gross deficits   Psychiatric: appropriate mood and affect     ECG:      Data Review (Labs):    Recent Results          Recent Results (from the past 24 hour(s))   C REACTIVE PROTEIN, QT     Collection Time: 02/17/17 2:02 PM   Result Value Ref Range     C-Reactive protein 4.7 (H) 0.0 - 0.9 mg/dL   METABOLIC PANEL, COMPREHENSIVE     Collection Time: 02/17/17 2:02 PM   Result Value Ref Range     Sodium 139 136 - 145 mmol/L     Potassium 4.5 3.5 - 5.1 mmol/L     Chloride 104 98 - 107 mmol/L     CO2 31 21 - 32 mmol/L     Anion gap 4 (L) 7 - 16 mmol/L     Glucose 217 (H) 65 - 100 mg/dL     BUN 52 (H) 8 - 23 MG/DL     Creatinine 1.38 (H) 0.6 - 1.0 MG/DL     GFR est AA 48 (L) >60 ml/min/1.73m2     GFR est non-AA 40 (L) >60 ml/min/1.73m2     Calcium 8.8 8.3 - 10.4 MG/DL     Bilirubin, total 0.4 0.2 - 1.1 MG/DL     ALT (SGPT) 45 12 - 65 U/L     AST (SGOT) 39 (H) 15 - 37 U/L     Alk. phosphatase 133 50 - 136 U/L     Protein, total 7.2 6.3 - 8.2 g/dL     Albumin 2.5 (L) 3.2 - 4.6 g/dL     Globulin 4.7 (H) 2.3 - 3.5 g/dL     A-G Ratio 0.5 (L) 1.2 - 3.5    CBC WITH AUTOMATED DIFF     Collection Time: 02/17/17 2:02 PM   Result Value Ref Range     WBC 11.9 (H) 4.3 - 11.1 K/uL     RBC 3.07 (L) 4.05 - 5.25 M/uL     HGB 9.2 (L) 11.7 - 15.4 g/dL     HCT 29.5 (L) 35.8 - 46.3 %     MCV 96.1 79.6 - 97.8 FL     MCH 30.0 26.1 - 32.9 PG     MCHC 31.2 (L) 31.4 - 35.0 g/dL     RDW 16.8 (H) 11.9 - 14.6 %     PLATELET 215 620 - 536 K/uL     MPV 10.5 (L) 10.8 - 14.1 FL     DF AUTOMATED        NEUTROPHILS 77 43 - 78 %     LYMPHOCYTES 13 13 - 44 %     MONOCYTES 9 4.0 - 12.0 %     EOSINOPHILS 1 0.5 - 7.8 %     BASOPHILS 0 0.0 - 2.0 %     IMMATURE GRANULOCYTES 0.3 0.0 - 5.0 %     ABS. NEUTROPHILS 9.2 (H) 1.7 - 8.2 K/UL     ABS. LYMPHOCYTES 1.5 0.5 - 4.6 K/UL     ABS. MONOCYTES 1.0 0.1 - 1.3 K/UL     ABS. EOSINOPHILS 0.1 0.0 - 0.8 K/UL     ABS.  BASOPHILS 0.0 0.0 - 0.2 K/UL     ABS. IMM.  GRANS. 0.0 0.0 - 0.5 K/UL   POC LACTIC ACID     Collection Time: 02/17/17 2:03 PM   Result Value Ref Range     Lactic Acid (POC) 1.6 0.5 - 1.9 mmol/L            Assessment / Plan:   Principal Problem:  Sacral decubitus ulcer (2/17/2017)     Active Problems:  UTI (urinary tract infection) (12/29/2012)     Pacemaker DDD [place] (1/7/2013)     Hypertension (11/24/2016)     Atrial fibrillation (HCC) (2/17/2017)     Chronic anticoagulation (2/17/2017)     Diabetes mellitus type 2 with complications ([facility] (3/24/5249)     COPD (chronic obstructive pulmonary disease) ([facility] (2/17/2017)              -admit to medical bed   -NPO for surgery consult   -has PCN allergy, added azactam and continue vancomycin   -wound care consult   -home meds as ordered, holding xarelto for surgery consult   -CXR due to dyspnea, continue nebulizers  -recheck UA with c/s   -will need SW for dispo   DVT Prophylaxis:SCD  FEN:NPO,IVF  Code Status: DNR  [facility] [place] addressed:daughter charlotte [phone]  EST LOS 3-5 days  Carlos Gonzalez MD     Signed By: Carlos Gonzalez MD      February 17, 2017

## 2017-02-22 NOTE — PROGRESS NOTES
Pt requesting pain medication prior to dressing change, dilaudid given IVP slow per order. Dressing change completed, pt tolerated well. Bed low and locked, call light within reach, family remains at bedside.

## 2017-02-22 NOTE — PROGRESS NOTES
Hospitalist Progress Note    2017  Admit Date: 2017 12:30 PM   NAME: Jose E Angel   :     MRN:  [id number]   Attending: Blanchie Schwab, MD  PCP:  Nydia Curran MD  Treatment Team: Attending Provider: Blanchie Schwab, MD; Consulting Provider: Cheyenne Oneal MD; Utilization Review: Luis Eduardo Mcdaniels RN    DNR   SUBJECTIVE:   Ms. Varun Osullivan is a 67 yo female who presented with c/o painful, draining sacral decubitus ulcer. Pt is at [facility]. Pt had wound vac at [facility] but was removed 17. She reports she was told the wound was healing and doing better until the wound vac was taken off. Pt underwent surgical debridement 17. She was also found to have UTI on UA. No urine cx sent. Wound cx 17 showing light proteus mirabilis and light alpha streptococcus. 17 tissue cx showing light GNRs. cx are pan sensitive Pt on azactam and Vanc. General Surgery following. Pt also found to have JORGE which has since resolved. Wound care following Denies n/v/d, abd pain, CP.      Past Medical History:   Diagnosis Date    Arthritis     generalized    Asthma     managed with inhalers    Atrial fibrillation ([address]     managed with medication and pacemaker    Chronic obstructive pulmonary disease ([address]     managed with inhalers    Diabetes mellitus, insulin dependent (IDDM), uncontrolled- A1c 11.5 2012    Embolism and thrombosis 2016    Hx of blood clots     x 2 right arm    Hypertension     managed with medication     Morbid obesity ([address]     BMI 49.7    Pacemaker     [place]    Peripheral neuropathy (HCC)     both feet and lower legs    Shortness of breath on exertion     managed with medication     Type 2 diabetes mellitus (HCC) Dx     insulin dependent/ Avg / low BS symptoms @ 60/ last A1C 7    Urinary frequency     Urinary incontinence      Recent Results (from the past 24 hour(s))   GLUCOSE, POC    Collection Time: 17  5:11 PM   Result Value Ref Range    Glucose (POC) 187 (H) 65 - 100 mg/dL   GLUCOSE, POC    Collection Time: 02/21/17 11:26 PM   Result Value Ref Range    Glucose (POC) 242 (H) 65 - 100 mg/dL   GLUCOSE, POC    Collection Time: 02/22/17  5:38 AM   Result Value Ref Range    Glucose (POC) 200 (H) 65 - 100 mg/dL   GLUCOSE, POC    Collection Time: 02/22/17 12:02 PM   Result Value Ref Range    Glucose (POC) 215 (H) 65 - 100 mg/dL     Allergies   Allergen Reactions    Penicillins Rash    Sulfa (Sulfonamide Antibiotics) Hives     Current Facility-Administered Medications   Medication Dose Route Frequency Provider Last Rate Last Dose    cefpodoxime (VANTIN) tablet 200 mg  200 mg Oral Q12H Weston Cane, NP        insulin glargine (LANTUS) injection 22 Units  22 Units SubCUTAneous QHS Weston Cane, NP        albuterol CONCENTRATE 2.5mg/0.5 mL neb soln  2.5 mg Nebulization Q6HWA RT Nory Cho MD   2.5 mg at 02/22/17 1355    And    budesonide (PULMICORT) 500 mcg/2 ml nebulizer suspension  500 mcg Nebulization BID RT Nory Cho MD   500 mcg at 02/22/17 0842    sodium chloride (NS) flush 5-10 mL  5-10 mL IntraVENous Ammon Bundy MD   Stopped at 02/22/17 1400    sodium chloride (NS) flush 5-10 mL  5-10 mL IntraVENous PRN Chelly Cornejo MD        amiodarone (CORDARONE) tablet 200 mg  200 mg Oral BID Nory Cho MD   200 mg at 02/22/17 0851    ascorbic acid (vitamin C) (VITAMIN C) tablet 500 mg  500 mg Oral BID Nory Cho MD   500 mg at 02/22/17 0724    aspirin delayed-release tablet 81 mg  81 mg Oral DAILY Nory Cho MD   81 mg at 02/22/17 0861    docusate sodium (COLACE) capsule 100 mg  100 mg Oral BID Nory Cho MD   100 mg at 02/22/17 8394    losartan (COZAAR) tablet 25 mg  25 mg Oral DAILY Nory Cho MD   25 mg at 02/22/17 0851    polyethylene glycol (MIRALAX) packet 17 g  17 g Oral BID Nory Cho MD   Stopped at 02/22/17 0900    pravastatin (PRAVACHOL) tablet 40 mg  40 mg Oral QHS Alessandro Riojas MD   40 mg at 17 2216    sodium chloride (NS) flush 5-10 mL  5-10 mL IntraVENous Q8H Alessandro Riojas MD   10 mL at 17 1415    sodium chloride (NS) flush 5-10 mL  5-10 mL IntraVENous PRN Alessandro Riojas MD        acetaminophen (TYLENOL) tablet 650 mg  650 mg Oral Q6H PRN Alessandro Riojas MD        HYDROcodone-acetaminophen (NORCO)  mg tablet 1 Tab  1 Tab Oral Q4H PRLIANG Riojas MD   1 Tab at 17 0907    HYDROmorphone (PF) (DILAUDID) injection 1 mg  1 mg IntraVENous Q4H PRLIANG Riojas MD   1 mg at 17 1623    naloxone [facility]) injection 0.4 mg  0.4 mg IntraVENous PRLIANG Riojas MD        ondansetron [facility] injection 4 mg  4 mg IntraVENous Q4H PRN Alessandro Riojas MD   4 mg at 17 0747    Saccharomyces boulardii (FLORASTOR) capsule 500 mg  500 mg Oral BID Alessandro Riojas MD   500 mg at 17 0851    hydrALAZINE (APRESOLINE) 20 mg/mL injection 10 mg  10 mg IntraVENous Q6H PRLIANG Riojas MD        insulin lispro (HUMALOG) injection   SubCUTAneous Q6H Alessandro Riojas MD   4 Units at 17 1224       Review of Systems negative with exception of pertinent positives noted above  PHYSICAL EXAM     Visit Vitals    /45 (BP 1 Location: Left arm, BP Patient Position: At rest)    Pulse 61    Temp 97.1 °F (36.2 °C)    Resp 18    Ht 5' 2\" (1.575 m)    Wt 122.9 kg (271 lb)    SpO2 95%    BMI 49.57 kg/m2      Temp (24hrs), Av °F (36.1 °C), Min:96.5 °F (35.8 °C), Max:97.4 °F (36.3 °C)    Oxygen Therapy  O2 Sat (%): 95 % (17 1425)  Pulse via Oximetry: 63 beats per minute (17 1355)  O2 Device: Nasal cannula (17)  O2 Flow Rate (L/min): 1 l/min (17)  FIO2 (%): 24 % (17)    Intake/Output Summary (Last 24 hours) at 17 1638  Last data filed at 17 1120   Gross per 24 hour   Intake                0 ml   Output             1450 ml   Net            -1450 ml      General: No acute distress, obese    Lungs: CTA bilaterally. Resp even and nonlabored  Heart:  S1S2 present without murmurs rubs gallops. RRR. No edema  Abdomen: Soft, non tender, obese. BS present  Extremities: Multiple toes bilateral feet amputated. Moves ext well  Neurologic:  No focal deficits  Psych: A/O X3  Skin: Sacral decub with dressing dry/intact     Results summary of Diagnostic Studies/Procedures copied from within [facility] EMR:         [address] Problems    Diagnosis Date Noted    Sacral decubitus ulcer 02/17/2017    Atrial fibrillation ([facility] 02/17/2017    Chronic anticoagulation 02/17/2017    Diabetes mellitus type 2 with complications ([facility] 59/27/1479    COPD (chronic obstructive pulmonary disease) ([facility] 02/17/2017    Hypertension 11/24/2016    Pacemaker DDD [facility] 01/07/2013    UTI (urinary tract infection) 12/29/2012     Plan:      Sacral decub, stage III: Surgery following, s/p debridement 2/19/2017. Wound care consulted. Stop IV abx. Start Vantin      UTI: no urine cx, stop IV abx, start Vantin      JORGE: resolved      Afib: continue amiodarone, restart xarelto      HTN: continue current regimen      DM II uncontrolled, hyperglycemia: BGL elevated. Diabetic diet.  A1C 7.9. continue SSI, increase lantus     DVT Prophylaxis: SCDs, xarelto  Plan of Care Discussed with: Supervising MD Dr. Christina De La Cruz, care team, pt   Edson Harry, WILSON

## 2017-02-23 NOTE — PROGRESS NOTES
Judith Klein M.D. Colon and Rectal Surgeon  [place] [address], [address] [facility], [facility] [address]  Phone: [phone] Fax: Shashi Lynch  MRN: [id number]    HISTORY OF PRESENT ILLNESS:   Jaylan Medina is a 66y.o. year old female who I was asked to see regarding a sacral decubitus. The patient stated she has known about a sacral decubitus since she was at [facility] in November 2016. She never saw a wound MD there and was never sent to a wound center for this wound. When [employer] stopped paying,\" she was sent to [place]. At that facility, she had been seeing a wound physician intermittently. She states it was being treated with a VAC, but does not know the details of why that was removed. She stated that she has been told day after day that \"the wound looks good, healing well\" up until 2/17/17, when she was told it \"looks really bad\" and she has to go to the hospital.      Taken for debridement 2/19/17. She has moderate incisional pain, but is otherwise doing okay. Nausea/vomiting improved. She has been seen by wound care. Plans to go back to [facility] today    REVIEW OF SYSTEMS:   Constitutional: No fevers/chills, no weakness, no fatigue, no lightheadedness, no night sweats, no insomnia, no appetite changes, no weight changes, no memory problems. Respiratory: No cough, +dyspnea, no wheezing, no hemoptysis, no sleep apnea   Cardiovascular: No chest pains, no chest pressure, no chest tightness, no palpitations   Gastrointestinal: No abdominal pains, no bowel habit changes, no nausea, no emesis, no hematochezia, no melena, no cramping, no constipation, no diarrhea, no incontinence, no jaundice, no diverticulosis. Otherwise per HPI     PHYSICAL EXAM:  Blood pressure 150/46, pulse 63, temperature 96.7 °F (35.9 °C), resp. rate 18, height 5' 2\" (1.575 m), weight 271 lb (122.9 kg), SpO2 92 %. Body mass index is 49.57 kg/(m^2).   The patient was evaluated in the presence of a medical assistant  General: Normotensive, in no acute distress, well developed, well nourished appearing   Chest:  Lungs clear, no rales, no rhonchi, no wheezes   Heart:  irreg, no murmurs, no rubs, no gallops   Abdomen:  Soft, obese, no tenderness, no rebound, no guarding, no masses, nondistended. Well healed lower midline, no hernia   Skin:  Unremarkable upper extremities. Left heel has a small eschar, no erythema, no drainage  Right heel has an unstageable ulcer with a leathery black eschar. No tenderness, no erythema, no drainge. Her sacral area was evaluated in the LLD position. Wound is much improved from my initial evaluation. No necrotic tissue at skin level. Wide open wound with clean, granulating bases. Minimal slough in base. No surrounding erythema. Recent Labs      02/21/17   0627   WBC  7.8   HGB  8.2*   HCT  27.1*   PLT  287       Recent Labs      02/21/17   0627   NA  139   K  4.4   CL  106   CO2  27   BUN  21   CREA  0.83       UA positive 2/14/17 with large LE and 4+ bacteria  UA still positive 2/17/17 with large LE and 2+ bacteria, 20-50 WBC  A1c 7.9  CRP 4.7  Lactate 1.6  Blood cultures 2/17/17--negative x5 days  Wound culture 2/17/17--proteus, e coli, alpha strep  Wound culture 2/19/17--light proteus and moderate e faecalis  Labs reviewed by me. ASSESSMENT:  Lalita sacral decubitus, stage III   S/p local debridement at bedside 2/17/17   S/p operative debridement of skin, SQ, and fascia 2/19/17  Bilateral heel wounds  UTI  JORGE    RECOMMENDATIONS:  --aggressive offloading of sacrum. Side to side rolls, avoid laying on her back  --tight glucose control  --on antibiotics per primary.     --continue saline gauze packing, as I am hoping that removing the gauze will hep continue the mechanical debridement  --ok to restart xarelto  --appreciate wound care consult  --offload heals, follow these areas conservatively  --will follow while in house.  --she does NOT need office follow up with me after discharge. Either the SNF physician or wound center can follow her sacral and heel areas. Cinthia Key MD  2/23/2017      Elements of this note have been created with speech-recognition software. As a result, errors in speech recognition may have occurred.

## 2017-02-23 NOTE — PROGRESS NOTES
Shift assessment complete. Pt alert and oriented x4, respirations present, even and unlabored, HOB elevated, pt denies any SOB at this time, breath sounds diminished, S1&S2 auscultated, HR regular, abd soft, and tender, active BS in all 4 quadrants, pt is on bedrest, pt has angeles cath draining yellow/straw urine to gravity with red flecks and sediment, saline lock in place and patent, non-pitting edema noted, pt has scattered ecchymosis, pt has wound to buttocks that is packed with a dressing in place c/d/i, pt also has wounds to both heels with mipalex in place, pt has no complaints of pain at this time, pt repositioned, pt instructed to call for assistance, pt verbalizes understanding, bed low and locked, side rails x3, call light within reach, family at the bedside.

## 2017-02-23 NOTE — PROGRESS NOTES
TRANSFER - OUT REPORT:    Verbal report given to Eliezer(name) on Liseth Conn  being transferred to [facility] ([facility](unit) for routine progression of care       Report consisted of patients Situation, Background, Assessment and   Recommendations(SBAR). Information from the following report(s) SBAR and Kardex was reviewed with the receiving nurse. Opportunity for questions and clarification was provided.       Patient Vitals for the past 12 hrs:   Temp Pulse Resp BP SpO2   02/23/17 1401 - - - - 100 %   02/23/17 1100 96.7 °F (35.9 °C) 63 18 150/46 92 %   02/23/17 0921 - - - - 94 %   02/23/17 0724 96 °F (35.6 °C) 61 18 152/54 93 %   02/23/17 0319 96.3 °F (35.7 °C) 60 18 167/60 93 %     Patient transported with:  Rivera catheter

## 2017-02-23 NOTE — PROGRESS NOTES
Shift assessment completed. Pt alert and oriented X4, reports pain to buttocks/sacrum, requests pain medication. Upon flushing IV, IV found to be infiltrated, gave PO pain medication instead. Respirations even and unlabored, breath sounds clear but diminished bilaterally. Skin warm and dry, pt with packed dressing to sacrum, did not change at this time. Bilateral heels with foam dressings C/D/I. Abdomen soft and non tender, bowel sounds active, pt reports constipation. S1S2 auscultated with regular rhythm. Bed low and locked, call light within reach, pt advised to call if assistance is needed.

## 2017-02-23 NOTE — PROGRESS NOTES
Assessment done via doc flow sheet. Pt resting in bed, turned to left side. Resp easy & regular, lung sounds diminished, O2 at 2L per nasal cannula, 94% sat. Rivera cath patent, draining yellow urine in bag. Sacral wound with dressing intact, rook boots to left lower extremity in place, right foot elevated on a pillow. Pt talking on the phone, call light within reach, instructed to call for assistance as needed.

## 2017-02-23 NOTE — DISCHARGE SUMMARY
Hospitalist Discharge Summary   Patient ID:  Jaylan Medina  [id number]  35 y.o.  1945  Admit date: 2/17/2017 12:30 PM  Discharge date and time: 2/23/2017  Attending: Ana Sellers, *  PCP:  Linda Robles MD  Treatment Team: Attending Provider: Ana Sellers MD; Consulting Provider: Judith Klein MD; Utilization Review: Silvia Stewart RN  Principal Diagnosis Sacral decubitus ulcer   Principal Problem:    Sacral decubitus ulcer (2/17/2017)    Active Problems:    UTI (urinary tract infection) (12/29/2012)      Pacemaker DDD [facility] (1/7/2013)      Hypertension (11/24/2016)      Atrial fibrillation ([facility] (2/17/2017)      Chronic anticoagulation (2/17/2017)      Diabetes mellitus type 2 with complications ([facility] (3/02/4544)      COPD (chronic obstructive pulmonary disease) ([facility] (2/17/2017)       * Admission Diagnoses: Sacral decubitus ulcer  Sacral Decubitus ulcer  * Discharge Diagnoses:    Hospital Problems as of 2/23/2017  Date Reviewed: 2/19/2017          Codes Class Noted - Resolved POA    * (Principal)Sacral decubitus ulcer ICD-10-CM: L89.159  ICD-9-CM: 707.03, 707.20  2/17/2017 - Present Yes        Atrial fibrillation ([facility] (Chronic) ICD-10-CM: I48.91  ICD-9-CM: 427.31  2/17/2017 - Present Yes        Chronic anticoagulation (Chronic) ICD-10-CM: Z79.01  ICD-9-CM: V58.61  2/17/2017 - Present Yes        Diabetes mellitus type 2 with complications ([facility] (Chronic) ICD-10-CM: E11.8  ICD-9-CM: 250.90  2/17/2017 - Present Yes        COPD (chronic obstructive pulmonary disease) ([facility] (Chronic) ICD-10-CM: J44.9  ICD-9-CM: 496  2/17/2017 - Present Yes        Hypertension (Chronic) ICD-10-CM: I10  ICD-9-CM: 401.9  11/24/2016 - Present Yes        Pacemaker DDD [facility] [place] ICD-10-CM: Z95.0  ICD-9-CM: V45.01  1/7/2013 - Present Yes        UTI (urinary tract infection) ICD-10-CM: N39.0  ICD-9-CM: 599.0  12/29/2012 - Present Yes                Hospital Course:  Ms. Christopher Priest is a 67 yo female who presented with c/o painful, draining sacral decubitus ulcer. Pt is at [facility]. Pt had wound vac at [facility] but was removed 2/17/17. She reports she was told the wound was healing and doing better until the wound vac was taken off. Pt underwent surgical debridement 2/19/17. She was also found to have UTI on UA. No urine cx sent. Wound cx 2/17/17 showing light proteus mirabilis and light alpha streptococcus. 2/19/17 tissue cx showing proteus. cx are pan sensitive Pt on azactam and Vanc, changed to Vantin yesterday and has done well.  afebrile. General Surgery following. Pt also found to have JORGE which has since resolved. Wound care following Denies n/v/d, abd pain, CP, SOB. Diagnostic Study/Procedure results summary copied from within [facility] EMR:  History: Dyspnea.     Exam: portable chest     Comparison: 2/3/2017     Findings: No new alveolar infiltrate or pleural effusion. A dual-lead pacemaker  overlies left chest wall. No pneumothorax demonstrated.     Impressions: Stable portable chest.           Labs: Results:       Chemistry Recent Labs      02/21/17   0627   GLU  185*   NA  139   K  4.4   CL  106   CO2  27   BUN  21   CREA  0.83   CA  8.6   AGAP  6*      CBC w/Diff Recent Labs      02/21/17   0627   WBC  7.8   RBC  2.76*   HGB  8.2*   HCT  27.1*   PLT  287   GRANS  79*   LYMPH  13   EOS  1      Cardiac Enzymes No results for input(s): CPK, CKND1, HUGO in the last 72 hours. No lab exists for component: CKRMB, TROIP   Coagulation No results for input(s): PTP, INR, APTT in the last 72 hours. No lab exists for component: INREXT    Lipid Panel @BRIEFLAB(CHOL,CHOLPOCT,401292,695074,[id number],CHOLX,CHOLP,CHLST,CHOLV,625826,HDL,HDLPOC,HDLPOCT,170350,NHDLCT,[id number],HDLC,HDLP,LDL,LDLPOCT,LDLCPOC,711435,NLDLCT,DLDL,LDLC,DLDLP,176162,VLDLC,VLDL,TGL,TGLX,TRIGL,[id number],TRIGP,TGLPOCT,959255,769332,CHHD,CHHDX)@   BNP No results for input(s): BNPP in the last 72 hours.    Liver Enzymes No results for input(s): TP, ALB, TBIL, AP, SGOT, GPT in the last 72 hours. No lab exists for component: DBIL   Thyroid Studies No results found for: T4, T3U, TSH, TSHEXT         Discharge Exam:  Visit Vitals    /54 (BP 1 Location: Left arm, BP Patient Position: At rest)    Pulse 61    Temp 96 °F (35.6 °C)    Resp 18    Ht 5' 2\" (1.575 m)    Wt 122.9 kg (271 lb)    SpO2 94%    BMI 49.57 kg/m2       General: No acute distress, obese    Lungs: CTA bilaterally. Resp even and nonlabored  Heart:  S1S2 present without murmurs rubs gallops. RRR. No edema  Abdomen: Soft, non tender, obese. BS present  Extremities: Multiple toes bilateral feet amputated. Moves ext well  Neurologic:  No focal deficits  Psych: A/O X3  Skin: Sacral decub with dressing dry/intact         Disposition: STR  Discharge Condition: stable  Patient Instructions:   Current Discharge Medication List      START taking these medications    Details   cefpodoxime (VANTIN) 200 mg tablet Take 1 Tab by mouth every twelve (12) hours for 7 days. Qty: 14 Tab, Refills: 0         CONTINUE these medications which have CHANGED    Details   !! HYDROcodone-acetaminophen (NORCO)  mg tablet Take 1 Tab by mouth three (3) times daily as needed for Pain. Max Daily Amount: 3 Tabs. Qty: 15 Tab, Refills: 0      !! HYDROcodone-acetaminophen (NORCO)  mg tablet Take 1 Tab by mouth every four (4) hours as needed. Max Daily Amount: 6 Tabs. Qty: 15 Tab, Refills: 0       !! - Potential duplicate medications found. Please discuss with provider. CONTINUE these medications which have NOT CHANGED    Details   fluticasone-vilanterol (BREO ELLIPTA) 100-25 mcg/dose inhaler Take 1 Puff by inhalation daily. calcium-cholecalciferol, d3, (CALCIUM 600 + D) 600-125 mg-unit tab Take 1 Tab by mouth daily. magnesium oxide (MAG-OX) 400 mg tablet Take 400 mg by mouth daily. insulin aspart (NOVOLOG FLEXPEN) 100 unit/mL inpn 6 Units by SubCUTAneous route daily.       B INFANTIS/B ANI/B SARAH/B BIFID (PROBIOTIC 4X PO) Take 1 Tab by mouth daily. multivitamin,tx-iron-ca-min (THERA-M) 27-0.4 mg tab Take 1 Tab by mouth daily. amiodarone (PACERONE) 400 mg tablet Take 400 mg by mouth two (2) times a day. ascorbic acid, vitamin C, (VITAMIN C) 500 mg tablet Take 500 mg by mouth two (2) times a day. benzonatate (TESSALON PERLES) 100 mg capsule Take 100 mg by mouth two (2) times daily as needed for Cough. rivaroxaban (XARELTO) 20 mg tab tablet Take 1 Tab by mouth nightly. Indications: PREVENT THROMBOEMBOLISM IN CHRONIC ATRIAL FIBRILLATION  Qty: 30 Tab, Refills: 11      aspirin delayed-release 81 mg tablet Take 81 mg by mouth daily. insulin glargine (LANTUS) 100 unit/mL injection 5 Units by SubCUTAneous route nightly. Qty: 1 Vial, Refills: 0      budesonide-formoterol (SYMBICORT) 160-4.5 mcg/actuation HFA inhaler Take 2 Puffs by inhalation two (2) times a day. docusate sodium (COLACE) 50 mg capsule Take 50 mg by mouth two (2) times a day. losartan (COZAAR) 25 mg tablet Take 25 mg by mouth every morning. cyanocobalamin (VITAMIN B-12) 2,500 mcg sublingual tablet Take 2,500 mcg by mouth every morning. polyethylene glycol (MIRALAX) 17 gram packet Take 17 g by mouth two (2) times a day. pravastatin (PRAVACHOL) 40 mg tablet Take 40 mg by mouth nightly. furosemide (LASIX) 40 mg tablet Take 40 mg by mouth daily as needed. STOP taking these medications       HYDROcodone-acetaminophen (NORCO) 5-325 mg per tablet Comments:   Reason for Stopping:         naproxen sodium (ANAPROX DS) 550 mg tablet Comments:   Reason for Stopping:         doxycycline (MONODOX) 100 mg capsule Comments:   Reason for Stopping:               Activity: PT/OT Eval and Treat  Diet: Cardiac Diet and Diabetic Diet  Wound Care: Aggressive offloading of sacrum, side to side rolls, avoid laying on her back, offload heals.   BID dressing changes with kerlix for packing, tegaderm to seal dressing.        DNR   Surrogate decision maker: daughter    Pneumonia and flu vaccine to be administered at discharge per hospital protocol   Follow-up  ·   [facility] MD  · Wound care follow at [facility]  · PCP prn  · DC to complete abx course for UTI/wound    Notes, labs, VS, diagnostic testing reviewed  Case discussed with pt, care team, Dr. Katja Roldan    Time spent to discharge patient 45 min  Signed:  Darwin Lin NP  2/23/2017  9:28 AM

## 2017-02-23 NOTE — WOUND CARE
Wound to sacrum cleansed and repacked with saline gauze. Wound base has pink base some areas pale. May be able to restart VAC in a few days or week. Going back to nursing facility. Patient allowed Rooke boot to left leg but absolutely refused to place it on her right lower leg. Pillow placed. Reeducated on wound healing and pressure relief. Used Tegaderm to better seal dressing today.

## 2017-03-17 NOTE — PROGRESS NOTES
[address]  Face to Face Encounter    Patients Name: Genie Byrne    YOB: 1945    Ordering Physician: Don Cade    Primary Diagnosis: UTI    Date of Face to Face:   3/17/2017                                  Face to Face Encounter findings are related to primary reason for home care:   yes. 1. I certify that the patient needs intermittent care as follows: skilled nursing care:  skilled observation/assessment, patient education, wound care and angeles catheter care  physical therapy: strengthening and stretching/ROM    2. I certify that this patient is homebound, that is: 1) patient requires the use of a walker device, special transportation, or assistance of another to leave the home; or 2) patient's condition makes leaving the home medically contraindicated; and 3) patient has a normal inability to leave the home and leaving the home requires considerable and taxing effort. Patient may leave the home for infrequent and short duration for medical reasons, and occasional absences for non-medical reasons. Homebound status is due to the following functional limitations: Patient with strength deficits limiting the performance of all ADL's without caregiver assistance or the use of an assistive device. Patient with poor safety awareness and is at risk for falls without assistance of another person and the use of an assistive device. Patient with poor ambulation endurance limiting their safe ability to ascend/descend the required number of steps to leave the home. 3. I certify that this patient is under my care and that I, or a nurse practitioner or  [id number], or clinical nurse specialist, or certified nurse midwife, working with me, had a Face-to-Face Encounter that meets the physician Face-to-Face Encounter requirements.   The following are the clinical findings from the [address] [place] [address] encounter that support the need for skilled services and is a summary of the encounter: see ER record     See attached progess note, discharge summary and summary of the patient's illness      Demetrius Santos RN  3/17/2017      THE FOLLOWING TO BE COMPLETED BY THE COMMUNITY PHYSICIAN:    I concur with the findings described above from the F2F encounter that this patient is homebound and in need of a skilled service.     Certifying Physician: _____________________________________      Printed Certifying Physician Name: _____________________________________    Date: _________________

## 2017-03-17 NOTE — ED NOTES
Srinivas called and advises that patient's daughter is about to leave with oxygen condenser and to call transport at this time.

## 2017-03-17 NOTE — ED PROVIDER NOTES
HPI Comments: Patient is a 78-year-old female who broke her leg several months ago and has been at a rehabilitation facility with a chronic indwelling Rivera catheter. Today she is presenting with suprapubic pain, burning with urination and leakage around her Rivera catheter. Denies any significant fevers, shortness of breath or diffuse abdominal pain. Mention she was treated for urinary tract infection several months ago. Patient had surgical debridement for sacral decubitus ulcer on 2/19/17. Patient is a 66 y.o. female presenting with urinary pain. The history is provided by the patient. No  was used. Urinary Pain    Pertinent negatives include no nausea, no vomiting, no abdominal pain and no back pain.         Past Medical History:   Diagnosis Date    Arthritis     generalized    Asthma     managed with inhalers    Atrial fibrillation ([address]     managed with medication and pacemaker    Chronic obstructive pulmonary disease ([address]     managed with inhalers    Diabetes mellitus, insulin dependent (IDDM), uncontrolled- A1c 11.5 12/29/2012    Embolism and thrombosis 12/5/2016    Hx of blood clots     x 2 right arm    Hypertension     managed with medication     Morbid obesity ([address]     BMI 49.7    Pacemaker     [place]    Peripheral neuropathy (HCC)     both feet and lower legs    Shortness of breath on exertion     managed with medication     Type 2 diabetes mellitus ([address] Dx 1991    insulin dependent/ Avg / low BS symptoms @ 58/ last A1C 7    Urinary frequency     Urinary incontinence        Past Surgical History:   Procedure Laterality Date    HX AMPUTATION Right     Great toe and 2nd toe    HX HYSTERECTOMY      HX KNEE REPLACEMENT Left     HX ORTHOPAEDIC Bilateral     tennis elbow surgery    HX REFRACTIVE SURGERY Bilateral     HX TONSIL AND ADENOIDECTOMY      REMOVAL OF HEEL SPUR Bilateral     VASCULAR SURGERY PROCEDURE UNLIST Right 12/28/2012    removed from arm         Family History:   Problem Relation Age of Onset    Heart Disease Mother     Diabetes Father        Social History     Social History    Marital status:      Spouse name: N/A    Number of children: N/A    Years of education: N/A     Occupational History    Not on file. Social History Main Topics    Smoking status: Former Smoker     Years: 2.00    Smokeless tobacco: Never Used      Comment: social smoking during teenage years   [facility] Alcohol use No    Drug use: No    Sexual activity: Not on file     Other Topics Concern    Not on file     Social History Narrative    Has 2 cats    Was a Plastic     Mother, brother, uncle with TB and             ALLERGIES: Penicillins and Sulfa (sulfonamide antibiotics)    Review of Systems   Constitutional: Negative for fatigue and fever. HENT: Negative for sore throat. Eyes: Negative for pain. Respiratory: Negative for cough, chest tightness and shortness of breath. Cardiovascular: Negative for leg swelling. Gastrointestinal: Negative for abdominal pain, nausea and vomiting. Genitourinary: Positive for dysuria. Musculoskeletal: Negative for back pain. Skin: Positive for wound. Neurological: Negative for syncope and headaches. Vitals:    03/17/17 1146   BP: 151/63   Pulse: 61   Resp: 18   Temp: 98.3 °F (36.8 °C)   SpO2: 99%   Weight: 110.2 kg (243 lb)   Height: 5' 5.5\" (1.664 m)            Physical Exam   Constitutional: She is oriented to person, place, and time. She appears well-developed and well-nourished. No distress. HENT:   Head: Normocephalic and atraumatic. Eyes: Conjunctivae and EOM are normal. Pupils are equal, round, and reactive to light. Neck: Normal range of motion. Neck supple. Cardiovascular: Normal rate, regular rhythm and normal heart sounds. Pulmonary/Chest: Effort normal and breath sounds normal. No respiratory distress. She has no wheezes. She has no rales. Abdominal: Soft.  She exhibits no distension. There is no tenderness. There is no rebound. Musculoskeletal: Normal range of motion. She exhibits no edema or tenderness. Neurological: She is alert and oriented to person, place, and time. Skin: Skin is warm and dry. No rash noted. She is not diaphoretic. Psychiatric: She has a normal mood and affect. Her behavior is normal.   Vitals reviewed. MDM  Number of Diagnoses or Management Options  Acute cystitis without hematuria: new and does not require workup  Hyperglycemia: new and does not require workup  Sacral decubitus ulcer, stage IV ([address]): new and does not require workup  Diagnosis management comments: Patient with evidence of UTI. Rivera has been changed out. She was given 1 g of Rocephin here in the emergency department. Upon chart review and close evaluation of patient's sacral decubitus ulcer I believe still needs to be followed by wound care. It seems to be the same size as it was when it was debris did approximately one month ago according to previous notes. We'll send the patient home on Keflex for urinary tract infection. Glucose control discussed. Patient states blood sugar has been quite high since receiving a steroid shot from her pulmonologist not very long ago. Discussed patient with nurse  to ensure close wound care follow-up in regards to patient's large sacral decubitus ulcer.        Amount and/or Complexity of Data Reviewed  Clinical lab tests: ordered and reviewed (Results for orders placed or performed during the hospital encounter of 03/17/17  -CBC WITH AUTOMATED DIFF       Result                                            Value                         Ref Range                       WBC                                               8.9                           4.3 - 11.1 K/uL                 RBC                                               3.39 (L)                      4.05 - 5.25 M/uL                HGB 10.3 (L)                      11.7 - 15.4 g/dL                HCT                                               34.1 (L)                      35.8 - 46.3 %                   MCV                                               100.6 (H)                     79.6 - 97.8 FL                  MCH                                               30.4                          26.1 - 32.9 PG                  MCHC                                              30.2 (L)                      31.4 - 35.0 g/dL                RDW                                               14.9 (H)                      11.9 - 14.6 %                   PLATELET                                          250                           150 - 450 K/uL                  MPV                                               10.7 (L)                      10.8 - 14.1 FL                  DF                                                AUTOMATED                                                     NEUTROPHILS                                       83 (H)                        43 - 78 %                       LYMPHOCYTES                                       10 (L)                        13 - 44 %                       MONOCYTES                                         6                             4.0 - 12.0 %                    EOSINOPHILS                                       1                             0.5 - 7.8 %                     BASOPHILS                                         0                             0.0 - 2.0 %                     IMMATURE GRANULOCYTES                             0.3                           0.0 - 5.0 %                     ABS. NEUTROPHILS                                  7.4                           1.7 - 8.2 K/UL                  ABS.  LYMPHOCYTES                                  0.9                           0.5 - 4.6 K/UL                  ABS. MONOCYTES                                    0.5 0.1 - 1.3 K/UL                  ABS. EOSINOPHILS                                  0.1                           0.0 - 0.8 K/UL                  ABS. BASOPHILS                                    0.0                           0.0 - 0.2 K/UL                  ABS. IMM.  GRANS.                                  0.0                           0.0 - 0.5 K/UL             -METABOLIC PANEL, COMPREHENSIVE       Result                                            Value                         Ref Range                       Sodium                                            137                           136 - 145 mmol/L                Potassium                                         4.0                           3.5 - 5.1 mmol/L                Chloride                                          98                            98 - 107 mmol/L                 CO2                                               32                            21 - 32 mmol/L                  Anion gap                                         7                             7 - 16 mmol/L                   Glucose                                           327 (H)                       65 - 100 mg/dL                  BUN                                               19                            8 - 23 MG/DL                    Creatinine                                        1.37 (H)                      0.6 - 1.0 MG/DL                 GFR est AA                                        49 (L)                        >60 ml/min/1.73m2               GFR est non-AA                                    40 (L)                        >60 ml/min/1.73m2               Calcium                                           8.5                           8.3 - 10.4 MG/DL                Bilirubin, total                                  0.4                           0.2 - 1.1 MG/DL                 ALT (SGPT)                                        64                            12 - 65 U/L AST (SGOT)                                        50 (H)                        15 - 37 U/L                     Alk. phosphatase                                  126                           50 - 136 U/L                    Protein, total                                    6.9                           6.3 - 8.2 g/dL                  Albumin                                           2.6 (L)                       3.2 - 4.6 g/dL                  Globulin                                          4.3 (H)                       2.3 - 3.5 g/dL                  A-G Ratio                                         0.6 (L)                       1.2 - 3.5                  -URINALYSIS W/ RFLX MICROSCOPIC       Result                                            Value                         Ref Range                       Color                                             LAURA                                                         Appearance                                        CLOUDY                                                        Specific gravity                                  1.010                         1.001 - 1.023                   pH (UA)                                           6.5                           5.0 - 9.0                       Protein                                           NEGATIVE                      NEG mg/dL                       Glucose                                           1000 (A)                      NEG mg/dL                       Ketone                                            NEGATIVE                      NEG mg/dL                       Bilirubin                                         NEGATIVE                      NEG                             Blood                                             LARGE (A)                     NEG                             Urobilinogen                                      0.2                           0.2 - 1.0 EU/dL Nitrites                                          NEGATIVE                      NEG                             Leukocyte Esterase                                LARGE (A)                     NEG                             WBC                                               10-20                         0 /hpf                          RBC                                               3-5                           0 /hpf                          Bacteria                                          2+ (H)                        0 /hpf                          Crystals                                          MARKED                        0 /LPF                          Amorphous Crystals                                3+ (H)                        0                          -URINE MICROSCOPIC       Result                                            Value                         Ref Range                       WBC                                               10-20                         0 /hpf                          RBC                                               3-5                           0 /hpf                          Epithelial cells                                  0                             0 /hpf                          Bacteria                                          2+ (H)                        0 /hpf                          Casts                                             0                             0 /lpf                          Crystals                                          TRIPLE PHOSPHATE              0 /LPF                          Amorphous Crystals                                3+ (H)                        0                               Mucus                                             0                             0 /lpf                     )  Tests in the medicine section of CPT®: ordered and reviewed  Review and summarize past medical records: yes  Independent visualization of images, tracings, or specimens: yes    Risk of Complications, Morbidity, and/or Mortality  Presenting problems: moderate  Diagnostic procedures: moderate  Management options: moderate    Patient Progress  Patient progress: stable    ED Course       Procedures

## 2017-03-17 NOTE — PROGRESS NOTES
Visited with patient at request of Dr Umu Araiza. Daughter Syed Velasco ([phone]) is at bedside. Patient is known to me - see previous case management documentation. Patient's daughter and patient both state that the decubiti began at [facility] from sitting in a wet diaper. States after insurance made them transfer patient from [facility] to [facility] in January it got much worse. States they are taking patient home today. [facility] has already ordered an airmattress and it is in place. They are in the process of obtaining a stand up lift and they have a bedside commode and a rollater. Patient is currently bedbound. States she lost her  'last Friday'.   Call to Shahrzad Gary at [facility] who will come to see patient to arrange RN for angeles and decubiti care as well as PT.

## 2017-03-17 NOTE — PROGRESS NOTES
Spoke to Kenny Asencio at [facility] again who states that they have all of patient's prescriptions and whatever she will need to go home. States they have had a little difficulty arranging the oxygen but they will have a concentrator and nasal cannulas for daughter when she comes to  prescriptions. States will educate daughter on how to use oxygen/concentrator. Daughter on her way to [facility] now. Notified daughter to call us when she is leaving [facility] so we can call transport to bring patient home to ensure oxygen will be there before patient gets there. Lesia Abdalla RN notified.

## 2017-03-17 NOTE — ED TRIAGE NOTES
Patient arrives with EMS from [address] [place] [address]. Patient complains of burning with urination. Patient has angeles in place.  BGl [phone]

## 2017-03-17 NOTE — ED NOTES
Patient with angeles cath removed that arrived with patient from [facility]), patient with new angeles cath placed at this time, noted with decubitus ulcer with tunneling present 7-11 o'clock, Dr. Neetu Brandon responded to beside at this time and visualized wound. Wet to dry dressing performed at this time, pillows under right side at this time.

## 2017-03-17 NOTE — ED NOTES
[facility] at bedside to transfer patient from facility. Patient is in no acute distress.

## 2017-03-17 NOTE — PROGRESS NOTES
Per Denae Williamson RN - patient states she does not believe that oxygen has been arranged by [facility]. Called to Jennifer Guy who states he will have SW call me. Also called Arnold Lopez at [facility] who states he can arrange 02 if [facility] has not done it.

## 2017-03-21 NOTE — PROGRESS NOTES
Call to Dr Beach Em office ([phone])  to arrange follow up for wound per Dr Saskia Uribe. Appointment made for Thursday, March 23rd at [facility].  Call to PCP Dr THURMAN [facility] office ([phone]) and spoke with Noni Berrios who states Alvaro Fox already made her an appointment for April 4th at 2:40. Call to daughter Merary North [phone] to let her know. She verbalizes understanding and states she is already aware of the appointment with Dr Corey Sosa.

## 2017-06-06 PROBLEM — N39.0 RECURRENT SEPSIS DUE TO URINARY TRACT INFECTION (HCC): Status: ACTIVE | Noted: 2017-01-01

## 2017-06-06 PROBLEM — L89.154 STAGE IV PRESSURE ULCER OF SACRAL REGION (HCC): Status: ACTIVE | Noted: 2017-01-01

## 2017-06-06 PROBLEM — A41.9 RECURRENT SEPSIS DUE TO URINARY TRACT INFECTION (HCC): Status: ACTIVE | Noted: 2017-01-01

## 2017-06-06 NOTE — H&P
History and Physical    Patient: Nita Gaona MRN: [id number]  SSN: [id number]    YOB: 1945  Age: 66 y.o. Sex: female      Subjective:      Nita Gaona is a 66 y.o. female who is well known to us from our home program. She was originally planned for a respite admission however, upon arrival it became evident that she is in need of higher level of care and more intensive nursing involvement. Therefore, she will be admitted GIP for dx of recurrent UTI's causing sepsis for management of inability to swallow/consume oral intake, complex wound care, pain, and family/pt support. Today she is accompanied by her dtr and 2 grandchildren who are not coping well with the ongoing decline of their loved one. Apparently, the patient has been refusing meds and food/fluids x at least 8 days. Only bites/sips here/there. She has Fent 25 in place but has been unable to take additional pain meds because she is so obtunded. Stage IV sacral wounds and necrosis to right foot/heel are concerning for worsening septic process and this has been brought to attention of her family.      Past Medical History:   Diagnosis Date    Arthritis     generalized    Asthma     managed with inhalers    Atrial fibrillation ([address]     managed with medication and pacemaker    Chronic obstructive pulmonary disease ([address]     managed with inhalers    Diabetes mellitus, insulin dependent (IDDM), uncontrolled- A1c 11.5 12/29/2012    Embolism and thrombosis ([address] 12/5/2016    Hx of blood clots     x 2 right arm    Hypertension     managed with medication     Morbid obesity (HCC)     BMI 49.7    Pacemaker     [place]    Peripheral neuropathy (HCC)     both feet and lower legs    Shortness of breath on exertion     managed with medication     Type 2 diabetes mellitus (HCC) Dx 1991    insulin dependent/ Avg / low BS symptoms @ 58/ last A1C 7    Urinary frequency     Urinary incontinence      Past Surgical History: Procedure Laterality Date    HX AMPUTATION Right     Great toe and 2nd toe    HX HYSTERECTOMY      HX KNEE REPLACEMENT Left     HX ORTHOPAEDIC Bilateral     tennis elbow surgery    HX REFRACTIVE SURGERY Bilateral     HX TONSIL AND ADENOIDECTOMY      REMOVAL OF HEEL SPUR Bilateral     VASCULAR SURGERY PROCEDURE UNLIST Right 12/28/2012    removed from arm      Family History   Problem Relation Age of Onset    Heart Disease Mother     Diabetes Father      Social History   Substance Use Topics    Smoking status: Former Smoker     Years: 2.00    Smokeless tobacco: Never Used      Comment: social smoking during teenage years   Abisai Re Alcohol use No      Prior to Admission medications    Medication Sig Start Date End Date Taking? Authorizing Provider   LORazepam (ATIVAN) 1 mg tablet Take 1 mg by mouth nightly. Indications: anxiety, INSOMNIA 5/24/17   Historical Provider   prochlorperazine (PROCHLORPERAZINE) 25 mg suppository Insert 25 mg into rectum every six (6) hours as needed. Insert one suppository rectally every 6 hrs as needed for nausea and vomiting  Indications: NAUSEA AND VOMITING 5/21/17   Historical Provider   prochlorperazine (COMPAZINE) 10 mg tablet Take 10 mg by mouth every six (6) hours as needed (nausea and vomiting). Indications: NAUSEA AND VOMITING 5/21/17   Historical Provider   acetaminophen (TYLENOL) 500 mg tablet Take 500 mg by mouth every four (4) hours as needed for Pain. 5/16/17   Historical Provider   fentaNYL (DURAGESIC) 25 mcg/hr PATCH 1 Patch by TransDERmal route every seventy-two (72) hours. 5/10/17   Historical Provider   senna (SENOKOT) 8.6 mg tablet Take 2 Tabs by mouth two (2) times a day. 4/25/17   Historical Provider   amiodarone (CORDARONE) 200 mg tablet Take 200 mg by mouth daily. 4/21/17   Historical Provider   budesonide-formoterol (SYMBICORT) 160-4.5 mcg/actuation HFA inhaler Take 2 Puffs by inhalation two (2) times a day.  4/21/17   Historical Provider   INSULIN NPH HUMAN ISOPHANE (HUMULIN N SC) 25 Units by SubCUTAneous route two (2) times a day. Hold for Blood glucose < 150 4/21/17   Historical Provider   OXYGEN-AIR DELIVERY SYSTEMS 4 L by Nasal route continuous. 4/21/17   Historical Provider   oxyCODONE IR (ROXICODONE) 10 mg tab immediate release tablet Take 10 mg by mouth every four (4) hours as needed (pain and/or shortness of breath). Historical Provider   LORazepam (ATIVAN) 0.5 mg tablet Take 0.5 mg by mouth three (3) times daily as needed for Anxiety (Nausea). 4/21/17   Historical Provider   citalopram (CELEXA) 20 mg tablet Take 20 mg by mouth daily. 4/21/17   Historical Provider   cyclobenzaprine (FLEXERIL) 10 mg tablet Take 10 mg by mouth three (3) times daily as needed for Muscle Spasm(s). 4/21/17   Historical Provider   losartan (COZAAR) 50 mg tablet Take 25 mg by mouth daily. 4/21/17   Historical Provider        Allergies   Allergen Reactions    Penicillins Rash    Sulfa (Sulfonamide Antibiotics) Hives       Review of Systems:  Review of systems not obtained due to patient factors. Objective:     Vitals:    06/06/17 1313   BP: 114/85   Pulse: 74   Resp: 17   Temp: 96.9 °F (36.1 °C)        Physical Exam:  GENERAL: cooperative, mild distress, appears stated age, toxic, moderately obese  LUNG: diminished breath sounds throughout and decreased tidal volume. HEART: regular rate and rhythm, S1, S2 normal, no murmur, click, rub or gallop  ABDOMEN: soft, non-tender. Bowel sounds normal. No masses,  no organomegaly  EXTREMITIES: Trace general edema. + distal pedal pulses. SKIN: Multiple areas of impaired skin integrity including stage IV decub to sacrum. Necrosis with purulent drainage to right heel and foot, DTI to right calf, stage I to II to left back, excoriation to periarea and abdominal folds, and finally stage II to left hip. NEUROLOGIC: Obtunded. Bed bound. Reflexes and motor strength impaired and generally weak.  Cranial nerves 2-12 and sensation grossly intact. Unable to follow commands or answer questions. PSYCHIATRIC: agitated intermittently. Assessment:     Hospital Problems  Date Reviewed: 6/6/2017          Codes Class Noted POA    Recurrent sepsis due to urinary tract infection ([facility] ICD-10-CM: A41.9, N39.0  ICD-9-CM: 038.9, 995.91, 599.0  6/6/2017 Unknown        Stage IV pressure ulcer of sacral region [facility]) ICD-10-CM: A79.667  ICD-9-CM: 707.03, 707.24  2/17/2017 Yes        Atrial fibrillation (HCC) (Chronic) ICD-10-CM: I48.91  ICD-9-CM: 427.31  2/17/2017 Yes        COPD (chronic obstructive pulmonary disease) (HCC) (Chronic) ICD-10-CM: J44.9  ICD-9-CM: 496  2/17/2017 Yes        * (Principal)Sepsis due to urinary tract infection ([facility] ICD-10-CM: A41.9, N39.0  ICD-9-CM: 038.9, 995.91, 599.0  11/27/2016 Yes              Plan:     Current Facility-Administered Medications   Medication Dose Route Frequency    morphine injection 2 mg  2 mg SubCUTAneous Q20MIN PRN    Or    morphine injection 2 mg  2 mg IntraVENous Q20MIN PRN    acetaminophen (TYLENOL) suppository 650 mg  650 mg Rectal Q3H PRN    bisacodyl (DULCOLAX) suppository 10 mg  10 mg Rectal PRN    haloperidol lactate (HALDOL) injection 2 mg  2 mg SubCUTAneous Q1H PRN    albuterol-ipratropium (DUO-NEB) 2.5 MG-0.5 MG/3 ML  3 mL Nebulization Q4H PRN    glycopyrrolate (ROBINUL) injection 0.2 mg  0.2 mg SubCUTAneous Q4H PRN    fentaNYL (DURAGESIC) 25 mcg/hr patch 1 Patch  1 Patch TransDERmal Q72H    [START ON 6/7/2017] metroNIDAZOLE (FLAGYL) tablet 500 mg  500 mg Topical DAILY    And    [START ON 6/7/2017] miconazole (MICOTIN) 2 % cream   Topical DAILY     1. GIP for dx of recurrent UTI's causing sepsis for management of inability to swallow/consume oral intake, complex wound care, pain, and family/pt support. 2. Inability to swallow/consume oral intake: Oral meds not possible. Will utilize SQ route for med admin. 3. Complex wound care: Please see orders.      4. Pain: Continue Fent 25 and add morphine 2 mg SQ PRN. 5. Family/pt support: Lengthy conversation with family about patient's ongoing decline and that she is in need of GIP level of care. We discuss her inability to eat, drink, and take PO meds and that she can not survive in this way. They are tearful but appropriate in their questions. We discuss plan of care including meds in the family room and they express concern about not knowing where her will is. IDG Team and key home team members notified of pt current status and that we will work together to meet the needs of this family as they will need increased support as still grieving loss of stepfather. Continue to monitor and palliate symptoms as they arise. PPS 20%.      Signed By: Doyle Louis NP     June 6, 2017

## 2017-06-06 NOTE — PROGRESS NOTES
Pt arrived via EMS. Family at bedside. No UOP since yesterday; angeles in place. Bladder scan completed with 76 mL showing. Large sacral wound 6.5 cm L x 5.25 W x 3.5 D with tunneling. Stage II to left hip. Large area of entire right back of Scattered stage I and superficial stage II/ excoriation/ shear. Excoriation to bita area and skin folds. Per Dtr, BP should only be taken in LEFT arm d/t blood clots in right arm.

## 2017-06-06 NOTE — PROGRESS NOTES
offered support for patient and family with the ministry of presence, compassion, active listening and prayer. The family was tearful. They reminded  of the recent loss of dad here at [place].  assured family their mom would have excellent care. They were feeding patient as  was leaving the room. Patient's  had been to see them earlier.        Plan: Follow up with family

## 2017-06-06 NOTE — PROGRESS NOTES
Pt family at bedside giving sips and bites of sherbert. Pt gagging but unable to expel. RN explained that it may be best to with hold oral intake while Pt is not tolerating. RN came back in to room to recheck Pt and family was offering more bites.

## 2017-06-07 NOTE — PROGRESS NOTES
All wound care performed and patient given morphine prn for pain x 4 doses and haldol prn for agitation. Patient moans, grimaces  and groans but denies any pain.

## 2017-06-07 NOTE — PROGRESS NOTES
Dr. Abhijeet Stroud notified for order confirmation. Wound care states to use flagyl 500 mg for sacral ulcer and 500 mg for right heel wound. Dr. Abhijeet Stroud stated that it should be 500 mg total to be used on both wounds and to mix flagyl and antifungal cream and spread it to cover both wounds.

## 2017-06-07 NOTE — PROGRESS NOTES
Patient report received. Patient eyes closed and sleeping peacefully. Pain -0/10 per FLACC. Respirations non labored. Per report angeles cath removed last shift and bladder scan this morningwith 0 (zero) urine noted. No needs identified at this time. Family at bedside sleeping.

## 2017-06-07 NOTE — PROGRESS NOTES
Progress Note    Patient: Chet Lubin MRN: [id number]  SSN: [id number]    YOB: 1945  Age: 66 y.o. Sex: female      Admit Date: 6/6/2017    LOS: 1 day     Subjective:   Obtunded. No family at bedside during examination. Met up with dtr later in hallway. Per nursing, no oral intake and has required PRN meds for agitation and pain. Review of Systems:  Review of systems not obtained due to patient factors. Objective:     Vitals:    06/06/17 1313 06/07/17 0420   BP: 114/85 (!) 89/44   Pulse: 74 68   Resp: 17 16   Temp: 96.9 °F (36.1 °C) 97.5 °F (36.4 °C)        Intake and Output:  Current Shift:    Last three shifts:      Physical Exam:  GENERAL: mild distress, appears stated age, toxic, moderately obese  LUNG: diminished breath sounds throughout and decreased tidal volume. HEART: regular rate and rhythm, S1, S2 normal, no murmur, click, rub or gallop  ABDOMEN: soft, non-tender. Bowel sounds normal. No masses, no organomegaly  EXTREMITIES: Trace general edema. + distal pedal pulses. SKIN: Multiple areas of impaired skin integrity including stage IV decub to sacrum. Necrosis with purulent drainage to right heel and foot, DTI to right calf, stage I to II to left back, excoriation to periarea and abdominal folds, and finally stage II to left hip. NEUROLOGIC: Obtunded. Bed bound. Reflexes and motor strength impaired and generally weak. Cranial nerves 2-12 and sensation grossly intact. Unable to follow commands or answer questions. PSYCHIATRIC: agitated intermittently. Lab/Data Review:  No new labs resulted in the last 24 hours.     Assessment:     Principal Problem:    Sepsis due to urinary tract infection ([address] (11/27/2016)    Active Problems:    Stage IV pressure ulcer of sacral region ([address] (2/17/2017)      Atrial fibrillation ([address] (2/17/2017)      COPD (chronic obstructive pulmonary disease) ([address] (2/17/2017)      Recurrent sepsis due to urinary tract infection ([address] (6/6/2017)        Plan:     Current Facility-Administered Medications   Medication Dose Route Frequency    morphine injection 2 mg  2 mg SubCUTAneous Q20MIN PRN    Or    morphine injection 2 mg  2 mg IntraVENous Q20MIN PRN    acetaminophen (TYLENOL) suppository 650 mg  650 mg Rectal Q3H PRN    bisacodyl (DULCOLAX) suppository 10 mg  10 mg Rectal PRN    haloperidol lactate (HALDOL) injection 2 mg  2 mg SubCUTAneous Q1H PRN    albuterol-ipratropium (DUO-NEB) 2.5 MG-0.5 MG/3 ML  3 mL Nebulization Q4H PRN    glycopyrrolate (ROBINUL) injection 0.2 mg  0.2 mg SubCUTAneous Q4H PRN    fentaNYL (DURAGESIC) 25 mcg/hr patch 1 Patch  1 Patch TransDERmal Q72H    metroNIDAZOLE (FLAGYL) tablet 500 mg  500 mg Topical DAILY    And    miconazole (MICOTIN) 2 % cream   Topical DAILY     1. GIP for dx of recurrent UTI's causing sepsis for management of inability to swallow/consume oral intake, complex wound care, pain, and family/pt support.      2. Inability to swallow/consume oral intake: Oral meds not possible. Will utilize SQ route for med admin.      3. Complex wound care: Please see orders.      4. Pain: Continue Fent 25 and add morphine 2 mg SQ PRN.      5. Family/pt support: Follow up conversation with dtjayce Cain today regarding her mother's decline and progression towards demise. Dietary tray held. Worsening obtundation. Continue to monitor and palliate symptoms as they arise. PPS 10%.      Signed By: Kaylyn Pulido NP     June 7, 2017

## 2017-06-08 NOTE — PROGRESS NOTES
Called to patient room. No heart beat, BP or respirations. Patient Time of death 09:55am. Patient daughter at bedside sleeping. Awakened and notified of mothers passing.  called to room. Spiritual support provided.  Grief appropriate/

## 2017-06-08 NOTE — PROGRESS NOTES
CARITOSW was called to the room to [facility]. CARITOSW spoke to the family and provided 3 resources for direct cremation. Discussed with family economics of cremation. Dtr was upset that her mother could not be buried with her dad. CARITOSW and dtr discussed options that she may have. She will have to contact the cemetary and see. Len Kramer is waiting on her step brother from [place] to get here before making a decision. [place] provided emotional support to Len Kramer and a friend of the pt in the room.

## 2017-06-08 NOTE — PROGRESS NOTES
provided spiritual and emotional support for patient's daughter and a [employer] friend following patient's death.  offered words of comfort and prayer.  contacted patient's  on behalf of the family. Amber Sanford is the interim  at [facility] in [place], [place]  Bereavement support to follow.

## 2017-06-12 VITALS
TEMPERATURE: 97.9 F | RESPIRATION RATE: 20 BRPM | HEART RATE: 84 BPM | SYSTOLIC BLOOD PRESSURE: 110 MMHG | DIASTOLIC BLOOD PRESSURE: 64 MMHG

## 2017-06-21 NOTE — [facility] IDG BEREAVEMENT NOTES
LATE ENTRY:    Patient death and family bereavement needs discussed at [address] [place] [address]. Bereavement risk factors indicate a bereavement risk score of LOW MODERATE . Bereavement follow up to be provided accordingly.

## 2017-10-18 PROCEDURE — A6199 ALGINATE DRSG WOUND FILLER: HCPCS

## 2022-07-30 NOTE — ED NOTES
I have reviewed discharge instructions with the patient and caregiver, daughter at bedside. The patient and caregiver verbalized understanding. [facility] nurse  advised that patient also needs oxygen set up for residence since nursing facility had not set up her oxygen at home as today was day of discharge from [facility]. Awaiting for home health care to respond to bedside to meet with patient. Female

## 2025-07-17 NOTE — PROGRESS NOTES
Shift assessment complete. Pt alert and oriented x4, respirations present, even and unlabored, HOB elevated, pt denies any SOB at this time, breath sounds diminished, S1&S2 auscultated, HR regular, abd soft, and tender, active BS in all 4 quadrants, pt is on bedrest, pt has angeles cath draining yellow/straw urine to gravity, SCDs in place and functioning, saline lock in place and patent, non-pitting edema noted, pt has scattered ecchymosis, pt has wound to buttocks that is packed with a dressing in place c/d/i, pt also has wounds to both heels with mipalex in place, pt rates pain  5/10 at this time, pt repositioned, pt instructed to call for assistance, pt verbalizes understanding, bed low and locked, side rails x3, call light within reach, family at the bedside. TONIO established, blood work sent to lab.

## (undated) DEVICE — SHEET, T, LAPAROTOMY, STERILE: Brand: MEDLINE

## (undated) DEVICE — AMD ANTIMICROBIAL BANDAGE ROLL,6 PLY: Brand: KERLIX

## (undated) DEVICE — TRAY PREP DRY W/ PREM GLV 2 APPL 6 SPNG 2 UNDPD 1 OVERWRAP

## (undated) DEVICE — SURGICAL PROCEDURE PACK BASIC ST FRANCIS

## (undated) DEVICE — SWAB CULT DBL W/O CHAR RAYON TIP AMIES GEL CLMN FOR COLL

## (undated) DEVICE — KENDALL SCD EXPRESS SLEEVES, KNEE LENGTH, MEDIUM: Brand: KENDALL SCD

## (undated) DEVICE — Device

## (undated) DEVICE — INTENDED FOR TISSUE SEPARATION, AND OTHER PROCEDURES THAT REQUIRE A SHARP SURGICAL BLADE TO PUNCTURE OR CUT.: Brand: BARD-PARKER SAFETY BLADES SIZE 15, STERILE